# Patient Record
Sex: FEMALE | Race: WHITE | NOT HISPANIC OR LATINO | Employment: FULL TIME | URBAN - METROPOLITAN AREA
[De-identification: names, ages, dates, MRNs, and addresses within clinical notes are randomized per-mention and may not be internally consistent; named-entity substitution may affect disease eponyms.]

---

## 2021-03-05 ENCOUNTER — TRANSFERRED RECORDS (OUTPATIENT)
Dept: HEALTH INFORMATION MANAGEMENT | Facility: CLINIC | Age: 66
End: 2021-03-05

## 2021-03-30 ENCOUNTER — TRANSFERRED RECORDS (OUTPATIENT)
Dept: HEALTH INFORMATION MANAGEMENT | Facility: CLINIC | Age: 66
End: 2021-03-30

## 2021-03-31 ENCOUNTER — TELEPHONE (OUTPATIENT)
Dept: FAMILY MEDICINE | Facility: OTHER | Age: 66
End: 2021-03-31

## 2021-03-31 NOTE — TELEPHONE ENCOUNTER
I called the patient and let her know that ENT is taking new patients. She was transferred to the  to make an appointment.

## 2021-03-31 NOTE — TELEPHONE ENCOUNTER
Pt calling and would like to know if  is taking new patients?    Has had polyps in sinuses removed x4.Last time was 2016.    She was told she should have it again before she starts on any other treatment for her lungs d/t AERD.        Please call at [phone]    Izabela Salmon RN

## 2021-04-19 ENCOUNTER — OFFICE VISIT (OUTPATIENT)
Dept: OTOLARYNGOLOGY | Facility: OTHER | Age: 66
End: 2021-04-19
Attending: OTOLARYNGOLOGY
Payer: COMMERCIAL

## 2021-04-19 VITALS
TEMPERATURE: 97.5 F | OXYGEN SATURATION: 94 % | HEIGHT: 63 IN | DIASTOLIC BLOOD PRESSURE: 72 MMHG | BODY MASS INDEX: 39.69 KG/M2 | WEIGHT: 224 LBS | HEART RATE: 73 BPM | SYSTOLIC BLOOD PRESSURE: 140 MMHG

## 2021-04-19 DIAGNOSIS — H60.8X3 CHRONIC ECZEMATOUS OTITIS EXTERNA OF BOTH EARS: ICD-10-CM

## 2021-04-19 DIAGNOSIS — J30.89 PERENNIAL ALLERGIC RHINITIS: ICD-10-CM

## 2021-04-19 DIAGNOSIS — J33.9 DISORDER OF RESPIRATORY SYSTEM EXACERBATED BY ASPIRIN: ICD-10-CM

## 2021-04-19 DIAGNOSIS — Z88.6 DISORDER OF RESPIRATORY SYSTEM EXACERBATED BY ASPIRIN: ICD-10-CM

## 2021-04-19 DIAGNOSIS — R43.0 ANOSMIA: ICD-10-CM

## 2021-04-19 DIAGNOSIS — J33.9 NASAL POLYP: ICD-10-CM

## 2021-04-19 DIAGNOSIS — J45.909 DISORDER OF RESPIRATORY SYSTEM EXACERBATED BY ASPIRIN: ICD-10-CM

## 2021-04-19 DIAGNOSIS — J32.4 CHRONIC PANSINUSITIS: Primary | ICD-10-CM

## 2021-04-19 PROBLEM — I10 ESSENTIAL HYPERTENSION: Status: ACTIVE | Noted: 2021-04-19

## 2021-04-19 PROBLEM — M20.41 HAMMER TOE OF RIGHT FOOT: Status: ACTIVE | Noted: 2017-10-31

## 2021-04-19 PROCEDURE — 31231 NASAL ENDOSCOPY DX: CPT | Performed by: OTOLARYNGOLOGY

## 2021-04-19 PROCEDURE — 99204 OFFICE O/P NEW MOD 45 MIN: CPT | Mod: 25 | Performed by: OTOLARYNGOLOGY

## 2021-04-19 RX ORDER — EPINEPHRINE 0.3 MG/.3ML
0.3 INJECTION SUBCUTANEOUS
Qty: 2 EACH | Refills: 11 | Status: SHIPPED | OUTPATIENT
Start: 2021-04-19

## 2021-04-19 RX ORDER — VIT C/B6/B5/MAGNESIUM/HERB 173 50-5-6-5MG
500 CAPSULE ORAL 2 TIMES DAILY
COMMUNITY
End: 2023-04-20

## 2021-04-19 RX ORDER — BUDESONIDE 0.5 MG/2ML
INHALANT ORAL
Qty: 2 ML | Refills: 11 | Status: SHIPPED | OUTPATIENT
Start: 2021-04-19 | End: 2021-06-28

## 2021-04-19 RX ORDER — PREDNISONE 10 MG/1
TABLET ORAL
Qty: 21 TABLET | Refills: 1 | Status: SHIPPED | OUTPATIENT
Start: 2021-04-19 | End: 2021-06-15

## 2021-04-19 RX ORDER — HYDROCHLOROTHIAZIDE 12.5 MG/1
12.5 TABLET ORAL DAILY
COMMUNITY
Start: 2021-01-13

## 2021-04-19 RX ORDER — CITALOPRAM HYDROBROMIDE 20 MG/1
20 TABLET ORAL DAILY
COMMUNITY

## 2021-04-19 RX ORDER — FLUTICASONE PROPIONATE AND SALMETEROL XINAFOATE 45; 21 UG/1; UG/1
2 AEROSOL, METERED RESPIRATORY (INHALATION) DAILY
COMMUNITY

## 2021-04-19 RX ORDER — MOMETASONE FUROATE 1 MG/G
OINTMENT TOPICAL DAILY
Qty: 45 G | Refills: 1 | Status: SHIPPED | OUTPATIENT
Start: 2021-04-19 | End: 2023-04-20

## 2021-04-19 RX ORDER — MONTELUKAST SODIUM 10 MG/1
10 TABLET ORAL DAILY
COMMUNITY
Start: 2021-03-30

## 2021-04-19 ASSESSMENT — PAIN SCALES - GENERAL: PAINLEVEL: NO PAIN (0)

## 2021-04-19 ASSESSMENT — MIFFLIN-ST. JEOR: SCORE: 1527.25

## 2021-04-19 NOTE — PROGRESS NOTES
Otolaryngology Consultation    Patient: Elaine Lara  : 1955    Patient presents with:  Consult: Sinus Polyps, Allergies, Chronic Sinusitis; Referred by Rufino Reed      HPI:  Elaine Lara is a 66 year old female seen today for chronic sinusitis and concerns for nasal polyps, referred by Dr. Rufino Reed.    She has a significant history of aspirin exacerbated respiratory syndrome (AERD) and chronic recurrent sinusitis with nasal polyps  She had 4 prior sinus surgeries most recently about 5 years ago with Dr. Lynch  She notes improvement post operatively  .  She had been using budesonide irrigations with some improvement  She recently had new exposure after her carpet was c reviewed former operative note of Dr. Lycnh dated  hanged out and resultant worsening chronic bilateral congestion    Current symptoms include bilateral congestion, chronic post nasal drainage and rhinorrhea  15-20 years of anosmia and taste disturbance    Her asthma has been well controlled    She has an anaphylactic response to ibuprofen     Polyposis was noted on recent exam by Dr. Walsh no recent imaging  She has responded well to prednisone taper in the past    Currently she takes Clarinex D Singulair and Advair, but thought she was allergic to histamine because she responded to histamine on skin testing and stopped her AH.  Congestion has worsened.    On 3/30/2021She was referred to Dr. Mcclellan in [place] for allergy testing.    Skin testing was negative.  She does not have an EpiPen.      Distant hx of perennial allergic rhinitis with treatment with subcutaneous immunotherapy in distant past, treated by Dr. Ayers in the past.    No recent imaging    SNOT 77    She lives in [place]  She has not used her nebulizer in 3 months  Works for [employer]    No tobacco quit 19 ys ago    No known family hx of nasal polyposis or severe allergies    She also notes occasional right globus she feels like something is caught in her upper  "right throat.  No weight loss or otalgia no tobacco use    Prior op note dated 2/26/2016 was reviewed by Dr. Lynch stated as bilateral revision nasal polypectomy bilateral revision endoscopic sphenoethmoidectomy.  Blood loss was 65 mL    Current Outpatient Rx   Medication Sig Dispense Refill     citalopram (CELEXA) 20 MG tablet Take 20 mg by mouth daily       fluticasone-salmeterol (ADVAIR HFA) 45-21 MCG/ACT inhaler Apply 2 puffs into one nostril alternating nostrils daily       hydrochlorothiazide (HYDRODIURIL) 12.5 MG tablet Take 12.5 mg by mouth daily       montelukast (SINGULAIR) 10 MG tablet Take 10 mg by mouth daily       Turmeric (QC TUMERIC COMPLEX) 500 MG CAPS Take 500 mg by mouth 2 times daily         Allergies: Ibuprofen, Sulfamethoxazole w/trimethoprim, Hydrocodone-acetaminophen, and Acetaminophen     No past medical history on file.    No past surgical history on file.    ENT family history reviewed    Social History     Tobacco Use     Smoking status: Never Smoker     Smokeless tobacco: Never Used   Substance Use Topics     Alcohol use: None     Drug use: None       Review of Systems  ROS: 10 point ROS neg other than the symptoms noted above in the HPI and eye floaters shortness of breath supine chronic ear plugging GERD    Physical Exam  BP (!) 140/72 (BP Location: Right arm, Patient Position: Sitting, Cuff Size: Adult Regular)   Pulse 73   Temp 97.5  F (36.4  C) (Tympanic)   Ht 1.588 m (5' 2.5\")   Wt 101.6 kg (224 lb)   SpO2 94%   BMI 40.32 kg/m    General - The patient is well nourished and well developed, and appears to have good nutritional status.  Alert and oriented to person and place, answers questions and cooperates with examination appropriately.   Head and Face - Normocephalic and atraumatic, with no gross asymmetry noted.  The facial nerve is intact, with strong symmetric movements.   Voice and Breathing - The patient was breathing comfortably without the use of accessory muscles. " There was no wheezing, stridor, or stertor.  The patients voice was clear and strong. hyponasal voice qualityNo jin peripheral digital clubbing or cyanosis   Ears -bilateral eczematoid otitis externa without canal cholesteatoma no otorrhea . the external auditory canals are patent, the tympanic membranes are intact without effusion, retraction or mass.  Bony landmarks are intact.  Eyes - Extraocular movements intact, and the pupils were reactive to light.  Sclera were not icteric or injected, conjunctiva were pink and moist.  Mouth - Examination of the oral cavity showed pink, healthy oral mucosa. No lesions or ulcerations noted.  The tongue was mobile and midline, and the dentition were in good condition.    Throat - The walls of the oropharynx were smooth, pink, moist, symmetric, and had no lesions or ulcerations.  The tonsillar pillars and soft palate were symmetric.  The uvula was midline on elevation.    Neck - No palpable enlarged fixed cervical lymph nodes.  No neck cysts or unusual tenderness to palpation.   No palpable fixed thyroid nodules or concerning goiter.  The trachea is grossly midline.   Nose - External contour is asymmetric.  Nasal mucosa is pink and moist with no abnormal mucus.  Bilateral occlusive nasal polyposis .  Diffuse boggy mucosa and inferior turbinate hypertrophy      Attempts at mirror laryngoscopy were not possible due to gag reflex.  Therefore I proceeded with a fiberoptic examination after informed consent.  First I applied topical nasal lidocaine and neosynephrine.  I then passed the scope through the nasal cavity.     The nasopharynx was mucosally covered and symmetric.  The eustachian tube openings were unobstructed on the left, partially occluded right with polyposis.  Going further down I had a clear view of the base of tongue which had normal appearing lingual tonsillar tissue.  The base of tongue was free of lesions, and the vallecula was open.  The epiglottis was smooth  and mucosally covered.  The supraglottic larynx was then clearly visualized.  The vocal cords moved smoothly and symmetrically and were without mass or nodules.  Vocal cord mobility was normal bilaterally with phonation and respiration..  The pyriform sinuses were open and without jin mass or pooling of secretions upon valsalva, and the limited view of the postcricoid region did not show any lesions.    I then changed to a rigid scope and suction debrided thick mucus from the inferior and middle meatus which are completely occluded with large inflammatory nasal polyps.  Elaine tolerated the procedure well.      Impression and Plan- Elaine Lara is a 66 year old female with:    ICD-10-CM    1. Chronic pansinusitis  J32.4 CT Sinus w/o Contrast     EPINEPHrine (ANY BX GENERIC EQUIV) 0.3 MG/0.3ML injection 2-pack     budesonide (PULMICORT) 0.5 MG/2ML neb solution     predniSONE (DELTASONE) 10 MG tablet   2. Nasal polyp  J33.9 CT Sinus w/o Contrast     predniSONE (DELTASONE) 10 MG tablet   3. Chronic eczematous otitis externa of both ears  H60.8X3 mometasone (ELOCON) 0.1 % external ointment   4. Disorder of respiratory system exacerbated by aspirin  J98.4     T39.015A    5. Anosmia  R43.0    6. h/o perennial allergic rhinitis post SCIT  J30.89      AERDS/chronic recurrent sinusitis with nasal polyps    CT sinuses pending  Continue budesonide irrigations  Restart any second gen antihistamine    Dilute vinegar ear wash  Elocon to EAC  Stop q-tips    Continue aspirin and ibu avoidance, desensitization briefly discussed as an option only if no improvement with surgery, topical steroids and dupixent    Risks of oral steroid use were discussed and include psychiatric/mood changes, insomnia, stomach ulcers and potential GI bleeding, blood sugar elevation/worsening diabetes, hip/bone necrosis called avascular necrosis, or bone demineralization.        Addendum:   CT sinus dated 4/23/21 shows pan opacification with polyp density  throughout the anterior and posterior ethmoid maxillary sinuses and extending into the right choana   there is an opacified left Onodi cell    hypoplastic frontal sinuses the small amount of aerated space is filled with polyp appearing opacification.    diffuse bony changes throughout the sinuses with thickening of the sphenoid bone and frontal bone the polyps extend into and occlude the inferior meatus and middle meatus     The skull base is intact  keros 2  The lamina is grossly intact but exceedingly thin bilaterally  the optic nerve is not dehiscent     [place] 22/24    The risks and complications of bilateral endoscopic sinus surgery with polypectomy, turbinate reduction were openly discussed.  The potential risks include bleeding, general anesthesia, infection, scar formation, need for additional surgery, septal perforation, and rarely injury to the eye with the possibility of blindness,  injury to the brain, meningitis or other intracranial complications.  Nonsurgical options were discussed including prolonged antibioitics and/or oral and topical steroids.   Sinus anatomy and sinus disease were reviewed.  CT will be reviewed prior to surgery prior operative note from Dr. Lynch dated 2/26/2016 was reviewed.  All questions were answered.     For post op pain she will use tylenol and prednisone, no ibuprofen, cannot take hydrocodone    Polyps will recur but hopefully never to this severity again, discussed with Elaine  No guarantees were given that her anosmia would resolve with surgery    Continue all current medications recommended by me or my staff.    Continue budesonide irrigations.  Verbal and written education on use provided.    The importance of budesonide irrigations postoperatively was reinforced.    The correct use of nasal irrigations as prescribed will prevent synechiae and allow for proper recovery of the sinus mucosa.       Anosmia precautions reinforced    Start Dupixent (dupilumab) once prior  authorization completed and after recovered from surgery  Treatment was discussed with today.  We discussed that this is a biologic and specifically an IL-4 receptor antagonist meaning it works entirely differently from other current medications.  Adverse reactions of therapy were discussed, including most commonly injection site reactions  Injection technique will be taught and self injections will be performed every 2 weeks.  Length of therapy was also discussed which is life long  Verbal and written education provided            Afua Cowan D.O.  Otolaryngology/Head and Neck Surgery  Allergy

## 2021-04-19 NOTE — PATIENT INSTRUCTIONS
Thank you for allowing Dr. Cowan and our ENT team to participate in your care.  If your medications are too expensive, please give the nurse a call.  We can possibly change this medication.  If you have a scheduling or an appointment question please contact our Health Unit Coordinator at their direct line [phone].   ALL nursing questions or concerns can be directed to your ENT nurse at: [phone] - [place]    Complete Sinus CT Scan  Use Budesonide Rinses Twice Daily  Start Prednisone 7 days before surgery   your epi pen  Complete Covid Testing 4 days before surgery  Follow up in surgery    Budesonide nasal saline irrigation per instructions:  -Obtain Aamir Med Sinus rinse over the counter.    -Use warm distilled water and 2 packets of the salt solution that comes with the bottle, dissolve in bottle up to the 240 mL desiree.  -Add 1 vial of budesonide.  -Irrigate each side of your nose leaning over the sink, using 1/3 to 1/2 the volume of the bottle in each nostril every irrigation.  Irrigate 2 times daily.  -If additional rinses are needed/recommended, you may use the plan Aamir Med Sinus irrigation without the use of added budesonide.         Instructions for Sinus Surgery    Recovery - Everyone recovers differently from a general anesthetic.  Symptoms such as fatigue, nausea, light-headedness, and sometimes a low grade fever (up to 100 degrees) are not unusual.  As your body removes the anesthetic drugs from circulation, these symptoms will resolve.  Your nose will be sore after surgery, and you may even have symptoms similar to a sinus infection with headache, congestion, and pressure.  These will resolve with healing.  For several days you may experience bloody drainage from the nose, please use the drip pad as necessary for this.  If there is persistent bleeding, please call the office during business hours or the on call ENT physician after hours.  There are no diet restrictions after  sinus surgery, and you can resume your home medications.      Please do not blow your nose until 1 week after surgery.  At 1 week you may gently blow your nose, unless otherwise indicated by Dr. Cowan.     Limit your activity to no strenuous activities until I see you for the first follow-up visit in approximately 2 weeks.      Medications - You were sent home with narcotic pain medication.  If you can tolerate the discomfort during your recovery by using just plain Tylenol or ibuprofen (advil), please do so.  However, do not hesitate to use the stronger pain medication if needed.  If you were sent home with an antibiotic, it is primarily used to help the healing process.  If it causes loose bowel movements or other signs of intolerance, it is appropriate to discontinue it.  By far the most important measure you can take to speed recovery, and maximize the chances of long term success of sinus surgery is using the sinus rinses at least three time per day for the first month after surgery.       Start Uzma Med saline irrigation tomorrow and use at least 5 times daily.     I recommend 2 uzma med bottles, one to add the budesonide to and irrigate with the budesonide rinse twice a day for 2 months.    In the other uzma med bottle use only the saline solution, and irrigate with this at least 3 additional times daily.    Perform gentle irrigation for the first week.  Starting 1 week after surgery, you should increase the volume of uzma med saline irrigation to each nostril, continuing to use the rinses in an alternating fashion at least 5 times daily.  You cannot use too much of the uzma med saline, but please limit budesonide rinses to twice daily.    At 2 weeks after surgery, you may also restart nasal steroids (flonase, nasonex, etc).        Complications - Problems related to sinus surgery almost always are detected during the operation, and special instruction will be given in that situation.  However,  unexpected things can happen, and are all related to the structures around the sinus cavities.  Symptoms that should alert you to a possible problem include: severe eye pain or eye swelling, persistent heavy bleeding from the nose, and high fevers with headache and neck pain.  Any of these symptoms should be called into my office or to the on call ENT if after hours.  The most common non-emergency complication of sinus surgery is the formation of scar tissue which can re-block the sinuses.  This is addressed below.    Follow-up -  As you have noted, there are quite a few follow-up visits after sinus surgery.  This is done to aggressively manage the most common complication of this technique, which is scar tissue blocking the sinuses.  These visits will require the examination of your nose and possibly removal of crusts of dry mucous and blood, with possible removal of early scar tissue.  Please prepare for these visits by using your sinus rinses.    If there are any questions or issues with the above, or if there are other issues that concern you, always feel free to call the clinic and I am happy to speak with you as soon as I can.    Afua Cowan D.O.  Otolaryngology/Head and Neck Surgery  Allergy    [phone] office            HOW TO PREPARE-      You need to have a scheduled Pre-Op with your primary care physician within 30 days of your scheduled surgery.        You need a friend or family member available to drive you home AND stay with you for 24 hours after you leave the hospital. You will not be allowed to drive yourself. IF you need to take a taxi or the bus you MUST have a responsible person to ride with you. YOUR PROCEDURE WILL BE CANCELLED IF YOU DO NOT HAVE A RESPONSIBLE ADULT TO DRIVE YOU HOME.       You CANNOT have anything to eat or drink after midnight the night before your surgery, including water and coffee. Your stomach needs to be completely empty. Do NOT chew gum, suck on hard  candy, or smoke. You can brush your teeth the morning of surgery.       The Surgery Education Nurses will call you  1-2 weeks prior to your surgery date at  [phone] or toll free [phone]. Please have your medication and allergy lists ready.      Stop your aspirin or other NSAIDs(Ibuprofen, Motrin, Aleve, Celebrex, Naproxen, etc...) 7 days before your surgery.      Hospital admitting will call you the day before your surgery with your exact arrival time.       Please call your primary care physician if you should become ill within 24 hours of scheduled surgery. (ex.vomiting, diarrhea, fever)          You will need to wash the night before AND the morning of you procedure with a liquid antibacterial soap, like Dial.

## 2021-04-19 NOTE — LETTER
2021         RE: Elaine Lara  [address] [facility] [address]        Dear Colleague,    Thank you for referring your patient, Elaine Lara, to the [facility]. Please see a copy of my visit note below.    Otolaryngology Consultation    Patient: Elaine Lara  : 1955    Patient presents with:  Consult: Sinus Polyps, Allergies, Chronic Sinusitis; Referred by Rufino Reed      HPI:  Elaine Lara is a 66 year old female seen today for chronic sinusitis and concerns for nasal polyps, referred by Dr. Rufino Reed.    She has a significant history of aspirin exacerbated respiratory syndrome (AERD) and chronic recurrent sinusitis with nasal polyps  She had 4 prior sinus surgeries most recently about 5 years ago with Dr. Lynch  She notes improvement post operatively  .  She had been using budesonide irrigations with some improvement  She recently had new exposure after her carpet was c reviewed former operative note of Dr. Lynch dated  hanged out and resultant worsening chronic bilateral congestion    Current symptoms include bilateral congestion, chronic post nasal drainage and rhinorrhea  15-20 years of anosmia and taste disturbance    Her asthma has been well controlled    She has an anaphylactic response to ibuprofen     Polyposis was noted on recent exam by Dr. Walsh no recent imaging  She has responded well to prednisone taper in the past    Currently she takes Clarinex D Singulair and Advair, but thought she was allergic to histamine because she responded to histamine on skin testing and stopped her AH.  Congestion has worsened.    On 3/30/2021She was referred to Dr. Mcclellan in [place] for allergy testing.    Skin testing was negative.  She does not have an EpiPen.      Distant hx of perennial allergic rhinitis with treatment with subcutaneous immunotherapy in distant past, treated by Dr. Ayers in the past.    No recent imaging    SNOT 77    She lives in  [place]  She has not used her nebulizer in 3 months  Works for [employer]    No tobacco quit 19 ys ago    No known family hx of nasal polyposis or severe allergies    She also notes occasional right globus she feels like something is caught in her upper right throat.  No weight loss or otalgia no tobacco use    Prior op note dated 2/26/2016 was reviewed by Dr. Lynch stated as bilateral revision nasal polypectomy bilateral revision endoscopic sphenoethmoidectomy.  Blood loss was 65 mL    Current Outpatient Rx   Medication Sig Dispense Refill     citalopram (CELEXA) 20 MG tablet Take 20 mg by mouth daily       fluticasone-salmeterol (ADVAIR HFA) 45-21 MCG/ACT inhaler Apply 2 puffs into one nostril alternating nostrils daily       hydrochlorothiazide (HYDRODIURIL) 12.5 MG tablet Take 12.5 mg by mouth daily       montelukast (SINGULAIR) 10 MG tablet Take 10 mg by mouth daily       Turmeric (QC TUMERIC COMPLEX) 500 MG CAPS Take 500 mg by mouth 2 times daily         Allergies: Ibuprofen, Sulfamethoxazole w/trimethoprim, Hydrocodone-acetaminophen, and Acetaminophen     No past medical history on file.    No past surgical history on file.    ENT family history reviewed    Social History     Tobacco Use     Smoking status: Never Smoker     Smokeless tobacco: Never Used   Substance Use Topics     Alcohol use: None     Drug use: None       Review of Systems  ROS: 10 point ROS neg other than the symptoms noted above in the HPI and eye floaters shortness of breath supine chronic ear plugging GERD    Physical Exam  BP (!) 140/72 (BP Location: Right arm, Patient Position: Sitting, Cuff Size: Adult Regular)   Pulse 73   Temp 97.5  F (36.4  C) (Tympanic)   Ht 1.588 m (5' 2.5\")   Wt 101.6 kg (224 lb)   SpO2 94%   BMI 40.32 kg/m    General - The patient is well nourished and well developed, and appears to have good nutritional status.  Alert and oriented to person and place, answers questions and cooperates with examination " appropriately.   Head and Face - Normocephalic and atraumatic, with no gross asymmetry noted.  The facial nerve is intact, with strong symmetric movements.   Voice and Breathing - The patient was breathing comfortably without the use of accessory muscles. There was no wheezing, stridor, or stertor.  The patients voice was clear and strong. hyponasal voice qualityNo jin peripheral digital clubbing or cyanosis   Ears -bilateral eczematoid otitis externa without canal cholesteatoma no otorrhea . the external auditory canals are patent, the tympanic membranes are intact without effusion, retraction or mass.  Bony landmarks are intact.  Eyes - Extraocular movements intact, and the pupils were reactive to light.  Sclera were not icteric or injected, conjunctiva were pink and moist.  Mouth - Examination of the oral cavity showed pink, healthy oral mucosa. No lesions or ulcerations noted.  The tongue was mobile and midline, and the dentition were in good condition.    Throat - The walls of the oropharynx were smooth, pink, moist, symmetric, and had no lesions or ulcerations.  The tonsillar pillars and soft palate were symmetric.  The uvula was midline on elevation.    Neck - No palpable enlarged fixed cervical lymph nodes.  No neck cysts or unusual tenderness to palpation.   No palpable fixed thyroid nodules or concerning goiter.  The trachea is grossly midline.   Nose - External contour is asymmetric.  Nasal mucosa is pink and moist with no abnormal mucus.  Bilateral occlusive nasal polyposis .  Diffuse boggy mucosa and inferior turbinate hypertrophy      Attempts at mirror laryngoscopy were not possible due to gag reflex.  Therefore I proceeded with a fiberoptic examination after informed consent.  First I applied topical nasal lidocaine and neosynephrine.  I then passed the scope through the nasal cavity.     The nasopharynx was mucosally covered and symmetric.  The eustachian tube openings were unobstructed on the  left, partially occluded right with polyposis.  Going further down I had a clear view of the base of tongue which had normal appearing lingual tonsillar tissue.  The base of tongue was free of lesions, and the vallecula was open.  The epiglottis was smooth and mucosally covered.  The supraglottic larynx was then clearly visualized.  The vocal cords moved smoothly and symmetrically and were without mass or nodules.  Vocal cord mobility was normal bilaterally with phonation and respiration..  The pyriform sinuses were open and without jin mass or pooling of secretions upon valsalva, and the limited view of the postcricoid region did not show any lesions.    I then changed to a rigid scope and suction debrided thick mucus from the inferior and middle meatus which are completely occluded with large inflammatory nasal polyps.  Elaine tolerated the procedure well.      Impression and Plan- Elaine Lara is a 66 year old female with:    ICD-10-CM    1. Chronic pansinusitis  J32.4 CT Sinus w/o Contrast     EPINEPHrine (ANY BX GENERIC EQUIV) 0.3 MG/0.3ML injection 2-pack     budesonide (PULMICORT) 0.5 MG/2ML neb solution     predniSONE (DELTASONE) 10 MG tablet   2. Nasal polyp  J33.9 CT Sinus w/o Contrast     predniSONE (DELTASONE) 10 MG tablet   3. Chronic eczematous otitis externa of both ears  H60.8X3 mometasone (ELOCON) 0.1 % external ointment   4. Disorder of respiratory system exacerbated by aspirin  J98.4     T39.015A    5. Anosmia  R43.0    6. h/o perennial allergic rhinitis post SCIT  J30.89      AERDS/chronic recurrent sinusitis with nasal polyps    CT sinuses pending  Continue budesonide irrigations  Restart any second gen antihistamine    Dilute vinegar ear wash  Elocon to EAC  Stop q-tips    Continue aspirin and ibu avoidance, desensitization briefly discussed as an option only if no improvement with surgery, topical steroids and dupixent    Risks of oral steroid use were discussed and include psychiatric/mood  changes, insomnia, stomach ulcers and potential GI bleeding, blood sugar elevation/worsening diabetes, hip/bone necrosis called avascular necrosis, or bone demineralization.      The risks and complications of bilateral endoscopic sinus surgery with polypectomy, turbinate reduction were openly discussed.  The potential risks include bleeding, general anesthesia, infection, scar formation, need for additional surgery, septal perforation, and rarely injury to the eye with the possibility of blindness,  injury to the brain, meningitis or other intracranial complications.  Nonsurgical options were discussed including prolonged antibioitics and/or oral and topical steroids.   Sinus anatomy and sinus disease were reviewed.  CT will be reviewed prior to surgery prior operative note from Dr. Lynch dated 2/26/2016 was reviewed.  All questions were answered.     Polyps will recur but hopefully never to this severity again, discussed with Elaine  No guarantees were given that her anosmia would resolve with surgery    Continue all current medications recommended by me or my staff.    Continue budesonide irrigations.  Verbal and written education on use provided.    The importance of budesonide irrigations postoperatively was reinforced.    The correct use of nasal irrigations as prescribed will prevent synechiae and allow for proper recovery of the sinus mucosa.       Anosmia precautions reinforced    Start Dupixent (dupilumab) once prior authorization completed and after recovered from surgery  Treatment was discussed with today.  We discussed that this is a biologic and specifically an IL-4 receptor antagonist meaning it works entirely differently from other current medications.  Adverse reactions of therapy were discussed, including most commonly injection site reactions  Injection technique will be taught and self injections will be performed every 2 weeks.  Length of therapy was also discussed which is life long  Verbal  and written education provided            Afua Cowan D.O.  Otolaryngology/Head and Neck Surgery  Allergy        Again, thank you for allowing me to participate in the care of your patient.        Sincerely,        Afua Cowan MD

## 2021-04-19 NOTE — NURSING NOTE
"Chief Complaint   Patient presents with     Consult     Sinus Polyps, Allergies, Chronic Sinusitis; Referred by Rufino Reed       Initial BP (!) 140/72 (BP Location: Right arm, Patient Position: Sitting, Cuff Size: Adult Regular)   Pulse 73   Temp 97.5  F (36.4  C) (Tympanic)   Ht 1.588 m (5' 2.5\")   Wt 101.6 kg (224 lb)   SpO2 94%   BMI 40.32 kg/m   Estimated body mass index is 40.32 kg/m  as calculated from the following:    Height as of this encounter: 1.588 m (5' 2.5\").    Weight as of this encounter: 101.6 kg (224 lb).  Medication Reconciliation: complete  Mercedes Jacinto LPN    "

## 2021-04-20 ENCOUNTER — TELEPHONE (OUTPATIENT)
Dept: ALLERGY | Facility: OTHER | Age: 66
End: 2021-04-20

## 2021-04-20 DIAGNOSIS — R43.0 ANOSMIA: ICD-10-CM

## 2021-04-20 DIAGNOSIS — J33.9 NASAL POLYP: ICD-10-CM

## 2021-04-20 DIAGNOSIS — J32.9 CHRONIC SINUSITIS, UNSPECIFIED LOCATION: Primary | ICD-10-CM

## 2021-04-20 NOTE — TELEPHONE ENCOUNTER
Dr. Cowan would like patient to start Dupixent.  Medication is pended for signature, with [facility].  Thank you!    Lilibeth Loera RN

## 2021-04-20 NOTE — TELEPHONE ENCOUNTER
Please prior-auth for dupixent   She is very interested   She will be going to surgery within next few months     Can start Dupixent asap--can be before surgery if approved     Thanks   JASBIR

## 2021-04-23 ENCOUNTER — HOSPITAL ENCOUNTER (OUTPATIENT)
Dept: CT IMAGING | Facility: HOSPITAL | Age: 66
Discharge: HOME | End: 2021-04-23
Attending: OTOLARYNGOLOGY | Admitting: OTOLARYNGOLOGY
Payer: COMMERCIAL

## 2021-04-23 DIAGNOSIS — J32.4 CHRONIC PANSINUSITIS: ICD-10-CM

## 2021-04-23 DIAGNOSIS — J33.9 NASAL POLYP: ICD-10-CM

## 2021-04-23 PROCEDURE — 70486 CT MAXILLOFACIAL W/O DYE: CPT

## 2021-04-26 ENCOUNTER — PREP FOR PROCEDURE (OUTPATIENT)
Dept: OTOLARYNGOLOGY | Facility: OTHER | Age: 66
End: 2021-04-26

## 2021-04-26 ENCOUNTER — TELEPHONE (OUTPATIENT)
Dept: OTOLARYNGOLOGY | Facility: OTHER | Age: 66
End: 2021-04-26

## 2021-04-26 ENCOUNTER — TELEPHONE (OUTPATIENT)
Dept: ALLERGY | Facility: OTHER | Age: 66
End: 2021-04-26

## 2021-04-26 DIAGNOSIS — J32.4 CHRONIC PANSINUSITIS: Primary | ICD-10-CM

## 2021-04-26 DIAGNOSIS — R43.0 ANOSMIA: ICD-10-CM

## 2021-04-26 DIAGNOSIS — J33.9 NASAL POLYP: ICD-10-CM

## 2021-04-26 NOTE — TELEPHONE ENCOUNTER
PA Initiation    Medication: dupixent  Insurance Company: [employer] ([facility]) - Phone [phone] Fax [phone]  Pharmacy Filling the Rx:    Filling Pharmacy Phone:    Filling Pharmacy Fax:    Start Date: 4/26/2021

## 2021-04-26 NOTE — TELEPHONE ENCOUNTER
Spoke with patient regarding Dupixent medication that Dr. Cowan would like the patient to start.  Explained the approval process and the fact the medication copay is often costly.  Explained there are numerous resources available to assist with cost.  Patient is aware this is a process and may take additional time if financial resources are needed.  Will mail Dupixent My Way paperwork to the patient so she can complete the enrollment paperwork and return it to this writer in case those resources are needed.  Patient verbalized understanding.    Also explained to the patient that the prescription was sent to [facility] for processing.  They will reach out to her if/when the medication is approved.    Lilibeth Loera RN

## 2021-04-30 NOTE — TELEPHONE ENCOUNTER
I received a phone call from Dipika at [employer] with questions regarding on the PA and answers provided.I was not able to help her. I am not able to reach Cecilia. Will route to her and ask that she return Dipika's call at [phone], Option 2. Case [id number].

## 2021-05-03 NOTE — TELEPHONE ENCOUNTER
I called [employer] #[phone], CONNOR Hardin advised they denied PA showing PT needed to show trials of 2 preferred  Therapy. I did advised rep Pt did try Mometasone, Montetlukast as well as fluticasone. Faxed Recent chart notes to Appeal line #[phone] to show recent notes to overide denial

## 2021-05-07 NOTE — TELEPHONE ENCOUNTER
Check Appeal statues #[phone], case [id number] ref #[id number], appeal under review as of 5/3, determination by 5/18

## 2021-05-18 NOTE — TELEPHONE ENCOUNTER
MEDICATION APPEAL APPROVED    Medication: dupixent  Authorization Effective Date:  5/14/21  Authorization Expiration Date:11/14/2021    Approved Dose/Quantity: 4/28  Reference #: [id number]   Insurance Company:    Expected CoPay:       CoPay Card Available:    350.00  Foundation Assistance Needed:    Which Pharmacy is filling the prescription (Not needed for infusion/clinic administered):

## 2021-05-18 NOTE — TELEPHONE ENCOUNTER
Spoke with patient.  She is receiving her Dupixent shipment on Friday, May 21st.  Patient is scheduled for the first available appointment to come in for education and self-injection instruction on May 27th.  Patient is aware she needs to bring the medication with her and that she will be here for approximately 1 hour.    Lilibeth Loera RN

## 2021-05-25 ENCOUNTER — ANESTHESIA EVENT (OUTPATIENT)
Dept: SURGERY | Facility: HOSPITAL | Age: 66
End: 2021-05-25
Payer: COMMERCIAL

## 2021-05-25 NOTE — ANESTHESIA PREPROCEDURE EVALUATION
Anesthesia Pre-Procedure Evaluation    Patient: Elaine Lara   MRN: [id number] : 1955        Preoperative Diagnosis: Chronic pansinusitis [J32.4]  Nasal polyp [J33.9]  Anosmia [R43.0]   Procedure : Procedure(s):  bilateral endoscopic sinus surgery with polypectomy  Turbinate reduction     No past medical history on file.   No past surgical history on file.   Allergies   Allergen Reactions     Ibuprofen Anaphylaxis     Other reaction(s): throat swells, hives  Head swelled up and started to swell in her throat before she was taken to the ER       Sulfamethoxazole W/Trimethoprim Rash     Hydrocodone-Acetaminophen Hives     Acetaminophen Rash     lortab      Social History     Tobacco Use     Smoking status: Never Smoker     Smokeless tobacco: Never Used   Substance Use Topics     Alcohol use: Not on file      Wt Readings from Last 1 Encounters:   21 101.6 kg (224 lb)        Anesthesia Evaluation   Pt has had prior anesthetic.     History of anesthetic complications  - PONV.  slow to wake.    ROS/MED HX  ENT/Pulmonary: Comment: Chronic sinusitis  Nasal polyp  Chronic eczematous otitis externa of both ears  Chronic pansinusitis    (+) allergic rhinitis, tobacco use, Past use, Moderate Persistent, asthma Treatment: Inhaler prn,      Neurologic:  - neg neurologic ROS     Cardiovascular:     (+) hypertension-----MAY. Previous cardiac testing   Echo: Date: Results:    Stress Test: Date: Results:    ECG Reviewed: Date: 2021 Results:  Possible anterior septal infarct   NSR  Cath: Date: Results:      METS/Exercise Tolerance: >4 METS    Hematologic:  - neg hematologic  ROS     Musculoskeletal:   (+) arthritis,     GI/Hepatic:     (+) GERD, Asymptomatic on medication,     Renal/Genitourinary: Comment: Mixed urge incontinence - neg Renal ROS     Endo: Comment: Impaired fasting glucose    (+) Obesity,     Psychiatric/Substance Use:  - neg psychiatric ROS     Infectious Disease:  - neg infectious disease ROS      Malignancy:  - neg malignancy ROS     Other:  - neg other ROS          Physical Exam    Airway        Mallampati: II   TM distance: > 3 FB   Neck ROM: full   Mouth opening: > 3 cm    Respiratory Devices and Support         Dental  no notable dental history         Cardiovascular   cardiovascular exam normal       Rhythm and rate: regular and normal     Pulmonary   pulmonary exam normal    Comment: Patient taking inhaler    (+) wheezes           OUTSIDE LABS:  CBC: No results found for: WBC, HGB, HCT, PLT  BMP: No results found for: NA, POTASSIUM, CHLORIDE, CO2, BUN, CR, GLC  COAGS: No results found for: PTT, INR, FIBR  POC: No results found for: BGM, HCG, HCGS  HEPATIC: No results found for: ALBUMIN, PROTTOTAL, ALT, AST, GGT, ALKPHOS, BILITOTAL, BILIDIRECT, YESI  OTHER: No results found for: PH, LACT, A1C, CORRINE, PHOS, MAG, LIPASE, AMYLASE, TSH, T4, T3, CRP, SED    Anesthesia Plan    ASA Status:  3   NPO Status:  NPO Appropriate    Anesthesia Type: General.              Consents    Anesthesia Plan(s) and associated risks, benefits, and realistic alternatives discussed. Questions answered and patient/representative(s) expressed understanding.     - Discussed with:  Patient      - Extended Intubation/Ventilatory Support Discussed: Yes.    Use of blood products discussed: Yes.     - Discussed with: Patient.     - Consented: consented to blood products            Reason for refusal: other.     Postoperative Care            Comments:    [facility]   Discussed risks and benefits with patient for general anesthesia including sore throat, nausea, vomiting, aspiration, dental damage, loss of airway, CV complications, stroke, MI, death. Pt wishes to proceed.             FRANCISCO Bowling CRNA

## 2021-05-26 ENCOUNTER — TRANSFERRED RECORDS (OUTPATIENT)
Dept: HEALTH INFORMATION MANAGEMENT | Facility: HOSPITAL | Age: 66
End: 2021-05-26

## 2021-05-26 LAB
CREAT SERPL-MCNC: 0.7 MG/DL (ref 0.52–1.04)
GFR SERPL CREATININE-BSD FRML MDRD: 84 ML/MIN/1.73M2
GLUCOSE SERPL-MCNC: 147 MG/DL (ref 65–105)
POTASSIUM SERPL-SCNC: 4.1 MMOL/L (ref 3.6–5.2)

## 2021-05-27 ENCOUNTER — PREP FOR PROCEDURE (OUTPATIENT)
Dept: OTOLARYNGOLOGY | Facility: OTHER | Age: 66
End: 2021-05-27

## 2021-05-27 ENCOUNTER — OFFICE VISIT (OUTPATIENT)
Dept: ALLERGY | Facility: OTHER | Age: 66
End: 2021-05-27
Attending: NURSE PRACTITIONER
Payer: COMMERCIAL

## 2021-05-27 DIAGNOSIS — J33.9 NASAL POLYP: Primary | ICD-10-CM

## 2021-05-27 DIAGNOSIS — J32.9 CHRONIC SINUSITIS, UNSPECIFIED LOCATION: ICD-10-CM

## 2021-05-27 DIAGNOSIS — R43.0 ANOSMIA: ICD-10-CM

## 2021-05-27 NOTE — PROGRESS NOTES
Prior to education, verified patient's identity using patient's name and date of birth.    Patient here for education on Dupixent medication.  All information is reviewed with patient regarding Dupixent (medication information, side effects, dosing/timing, administration sites, medication storage, etc).  Patient is taught how to self-administer the injection using a Dupixent auto-injector .  Return demonstration is done by patient.  Patient then self-administered the medication without issue.  Medication was shipped to the patient's home address from the [facility], it was not provided by the inpatient pharmacy.  Patient held for 30 minutes post-injection to monitor for any possible reactions, no reaction noted.  Patient advised to call with any questions or concerning side effects.  Anaphylaxis was reviewed with patient, and patient is aware of what symptoms would warrant seeking emergency medical care.  Patient verbalized understanding.    Lilibeth Loera RN

## 2021-05-28 ENCOUNTER — OFFICE VISIT (OUTPATIENT)
Dept: FAMILY MEDICINE | Facility: OTHER | Age: 66
End: 2021-05-28
Attending: OTOLARYNGOLOGY
Payer: COMMERCIAL

## 2021-05-28 DIAGNOSIS — Z20.822 COVID-19 RULED OUT: Primary | ICD-10-CM

## 2021-05-28 LAB
SARS-COV-2 RNA RESP QL NAA+PROBE: NORMAL
SPECIMEN SOURCE: NORMAL

## 2021-05-28 PROCEDURE — U0003 INFECTIOUS AGENT DETECTION BY NUCLEIC ACID (DNA OR RNA); SEVERE ACUTE RESPIRATORY SYNDROME CORONAVIRUS 2 (SARS-COV-2) (CORONAVIRUS DISEASE [COVID-19]), AMPLIFIED PROBE TECHNIQUE, MAKING USE OF HIGH THROUGHPUT TECHNOLOGIES AS DESCRIBED BY CMS-2020-01-R: HCPCS | Performed by: OTOLARYNGOLOGY

## 2021-05-28 PROCEDURE — U0005 INFEC AGEN DETEC AMPLI PROBE: HCPCS | Performed by: OTOLARYNGOLOGY

## 2021-05-29 LAB
LABORATORY COMMENT REPORT: NORMAL
SARS-COV-2 RNA RESP QL NAA+PROBE: NEGATIVE
SPECIMEN SOURCE: NORMAL

## 2021-06-01 ENCOUNTER — HOSPITAL ENCOUNTER (OUTPATIENT)
Facility: HOSPITAL | Age: 66
Discharge: HOME | End: 2021-06-01
Attending: OTOLARYNGOLOGY | Admitting: OTOLARYNGOLOGY
Payer: COMMERCIAL

## 2021-06-01 ENCOUNTER — ANESTHESIA (OUTPATIENT)
Dept: SURGERY | Facility: HOSPITAL | Age: 66
End: 2021-06-01
Payer: COMMERCIAL

## 2021-06-01 VITALS
WEIGHT: 217 LBS | HEIGHT: 62 IN | TEMPERATURE: 98.7 F | OXYGEN SATURATION: 92 % | HEART RATE: 70 BPM | BODY MASS INDEX: 39.93 KG/M2 | SYSTOLIC BLOOD PRESSURE: 124 MMHG | RESPIRATION RATE: 16 BRPM | DIASTOLIC BLOOD PRESSURE: 68 MMHG

## 2021-06-01 DIAGNOSIS — R43.0 ANOSMIA: ICD-10-CM

## 2021-06-01 DIAGNOSIS — J33.9 NASAL POLYP: Primary | ICD-10-CM

## 2021-06-01 DIAGNOSIS — J32.4 CHRONIC PANSINUSITIS: ICD-10-CM

## 2021-06-01 DIAGNOSIS — Z98.890 S/P FESS (FUNCTIONAL ENDOSCOPIC SINUS SURGERY): ICD-10-CM

## 2021-06-01 PROCEDURE — 258N000003 HC RX IP 258 OP 636: Performed by: NURSE ANESTHETIST, CERTIFIED REGISTERED

## 2021-06-01 PROCEDURE — 31257 NSL/SINS NDSC TOT W/SPHENDT: CPT | Performed by: NURSE ANESTHETIST, CERTIFIED REGISTERED

## 2021-06-01 PROCEDURE — 87075 CULTR BACTERIA EXCEPT BLOOD: CPT | Performed by: OTOLARYNGOLOGY

## 2021-06-01 PROCEDURE — 250N000011 HC RX IP 250 OP 636: Performed by: NURSE ANESTHETIST, CERTIFIED REGISTERED

## 2021-06-01 PROCEDURE — 710N000012 HC RECOVERY PHASE 2, PER MINUTE: Performed by: OTOLARYNGOLOGY

## 2021-06-01 PROCEDURE — C1726 CATH, BAL DIL, NON-VASCULAR: HCPCS | Performed by: OTOLARYNGOLOGY

## 2021-06-01 PROCEDURE — 31276 NSL/SINS NDSC FRNT TISS RMVL: CPT | Mod: LT | Performed by: OTOLARYNGOLOGY

## 2021-06-01 PROCEDURE — 250N000011 HC RX IP 250 OP 636: Performed by: OTOLARYNGOLOGY

## 2021-06-01 PROCEDURE — C9046 COCAINE HCL NASAL SOLUTION: HCPCS | Performed by: OTOLARYNGOLOGY

## 2021-06-01 PROCEDURE — 250N000009 HC RX 250: Performed by: NURSE ANESTHETIST, CERTIFIED REGISTERED

## 2021-06-01 PROCEDURE — 88311 DECALCIFY TISSUE: CPT | Mod: TC | Performed by: OTOLARYNGOLOGY

## 2021-06-01 PROCEDURE — 31257 NSL/SINS NDSC TOT W/SPHENDT: CPT | Mod: 22 | Performed by: OTOLARYNGOLOGY

## 2021-06-01 PROCEDURE — 999N000141 HC STATISTIC PRE-PROCEDURE NURSING ASSESSMENT: Performed by: OTOLARYNGOLOGY

## 2021-06-01 PROCEDURE — 88312 SPECIAL STAINS GROUP 1: CPT | Mod: TC | Performed by: OTOLARYNGOLOGY

## 2021-06-01 PROCEDURE — 87102 FUNGUS ISOLATION CULTURE: CPT | Performed by: OTOLARYNGOLOGY

## 2021-06-01 PROCEDURE — 99135 ANES COMP CTRLD HYPOTENSION: CPT | Performed by: NURSE ANESTHETIST, CERTIFIED REGISTERED

## 2021-06-01 PROCEDURE — 87077 CULTURE AEROBIC IDENTIFY: CPT | Performed by: OTOLARYNGOLOGY

## 2021-06-01 PROCEDURE — 360N000076 HC SURGERY LEVEL 3, PER MIN: Performed by: OTOLARYNGOLOGY

## 2021-06-01 PROCEDURE — 30930 THER FX NASAL INF TURBINATE: CPT | Performed by: OTOLARYNGOLOGY

## 2021-06-01 PROCEDURE — 31267 ENDOSCOPY MAXILLARY SINUS: CPT | Mod: 22 | Performed by: OTOLARYNGOLOGY

## 2021-06-01 PROCEDURE — 250N000025 HC SEVOFLURANE, PER MIN: Performed by: OTOLARYNGOLOGY

## 2021-06-01 PROCEDURE — 250N000013 HC RX MED GY IP 250 OP 250 PS 637: Performed by: NURSE ANESTHETIST, CERTIFIED REGISTERED

## 2021-06-01 PROCEDURE — 87070 CULTURE OTHR SPECIMN AEROBIC: CPT | Performed by: OTOLARYNGOLOGY

## 2021-06-01 PROCEDURE — 250N000009 HC RX 250: Performed by: OTOLARYNGOLOGY

## 2021-06-01 PROCEDURE — 61782 SCAN PROC CRANIAL EXTRA: CPT | Performed by: OTOLARYNGOLOGY

## 2021-06-01 PROCEDURE — 87186 SC STD MICRODIL/AGAR DIL: CPT | Performed by: OTOLARYNGOLOGY

## 2021-06-01 PROCEDURE — 710N000010 HC RECOVERY PHASE 1, LEVEL 2, PER MIN: Performed by: OTOLARYNGOLOGY

## 2021-06-01 PROCEDURE — 370N000017 HC ANESTHESIA TECHNICAL FEE, PER MIN: Performed by: OTOLARYNGOLOGY

## 2021-06-01 PROCEDURE — 87076 CULTURE ANAEROBE IDENT EACH: CPT | Mod: 59 | Performed by: OTOLARYNGOLOGY

## 2021-06-01 PROCEDURE — 88304 TISSUE EXAM BY PATHOLOGIST: CPT | Mod: TC | Performed by: OTOLARYNGOLOGY

## 2021-06-01 PROCEDURE — 272N000001 HC OR GENERAL SUPPLY STERILE: Performed by: OTOLARYNGOLOGY

## 2021-06-01 PROCEDURE — 87205 SMEAR GRAM STAIN: CPT | Performed by: OTOLARYNGOLOGY

## 2021-06-01 RX ORDER — NALOXONE HYDROCHLORIDE 0.4 MG/ML
0.2 INJECTION, SOLUTION INTRAMUSCULAR; INTRAVENOUS; SUBCUTANEOUS
Status: DISCONTINUED | OUTPATIENT
Start: 2021-06-01 | End: 2021-06-01 | Stop reason: HOSPADM

## 2021-06-01 RX ORDER — ONDANSETRON 2 MG/ML
INJECTION INTRAMUSCULAR; INTRAVENOUS PRN
Status: DISCONTINUED | OUTPATIENT
Start: 2021-06-01 | End: 2021-06-01

## 2021-06-01 RX ORDER — PROPOFOL 10 MG/ML
INJECTION, EMULSION INTRAVENOUS CONTINUOUS PRN
Status: DISCONTINUED | OUTPATIENT
Start: 2021-06-01 | End: 2021-06-01

## 2021-06-01 RX ORDER — DEXAMETHASONE SODIUM PHOSPHATE 10 MG/ML
INJECTION, SOLUTION INTRAMUSCULAR; INTRAVENOUS PRN
Status: DISCONTINUED | OUTPATIENT
Start: 2021-06-01 | End: 2021-06-01

## 2021-06-01 RX ORDER — PREDNISONE 10 MG/1
TABLET ORAL
Qty: 21 TABLET | Refills: 0 | Status: SHIPPED | OUTPATIENT
Start: 2021-06-01 | End: 2021-06-15

## 2021-06-01 RX ORDER — ONDANSETRON 2 MG/ML
4 INJECTION INTRAMUSCULAR; INTRAVENOUS EVERY 30 MIN PRN
Status: DISCONTINUED | OUTPATIENT
Start: 2021-06-01 | End: 2021-06-01 | Stop reason: HOSPADM

## 2021-06-01 RX ORDER — NALOXONE HYDROCHLORIDE 0.4 MG/ML
0.4 INJECTION, SOLUTION INTRAMUSCULAR; INTRAVENOUS; SUBCUTANEOUS
Status: DISCONTINUED | OUTPATIENT
Start: 2021-06-01 | End: 2021-06-01 | Stop reason: HOSPADM

## 2021-06-01 RX ORDER — FENTANYL CITRATE 50 UG/ML
25-50 INJECTION, SOLUTION INTRAMUSCULAR; INTRAVENOUS EVERY 5 MIN PRN
Status: DISCONTINUED | OUTPATIENT
Start: 2021-06-01 | End: 2021-06-01 | Stop reason: HOSPADM

## 2021-06-01 RX ORDER — SODIUM CHLORIDE, SODIUM LACTATE, POTASSIUM CHLORIDE, CALCIUM CHLORIDE 600; 310; 30; 20 MG/100ML; MG/100ML; MG/100ML; MG/100ML
INJECTION, SOLUTION INTRAVENOUS CONTINUOUS
Status: DISCONTINUED | OUTPATIENT
Start: 2021-06-01 | End: 2021-06-01 | Stop reason: HOSPADM

## 2021-06-01 RX ORDER — LIDOCAINE 40 MG/G
CREAM TOPICAL
Status: DISCONTINUED | OUTPATIENT
Start: 2021-06-01 | End: 2021-06-01 | Stop reason: HOSPADM

## 2021-06-01 RX ORDER — PREDNISONE 10 MG/1
TABLET ORAL
Qty: 21 TABLET | Refills: 0 | Status: SHIPPED | OUTPATIENT
Start: 2021-06-02 | End: 2021-06-25

## 2021-06-01 RX ORDER — LABETALOL 20 MG/4 ML (5 MG/ML) INTRAVENOUS SYRINGE
10
Status: DISCONTINUED | OUTPATIENT
Start: 2021-06-01 | End: 2021-06-01 | Stop reason: HOSPADM

## 2021-06-01 RX ORDER — TRIAMCINOLONE ACETONIDE 40 MG/ML
INJECTION, SUSPENSION INTRA-ARTICULAR; INTRAMUSCULAR PRN
Status: DISCONTINUED | OUTPATIENT
Start: 2021-06-01 | End: 2021-06-01 | Stop reason: HOSPADM

## 2021-06-01 RX ORDER — COCAINE HYDROCHLORIDE 40 MG/ML
SOLUTION NASAL PRN
Status: DISCONTINUED | OUTPATIENT
Start: 2021-06-01 | End: 2021-06-01 | Stop reason: HOSPADM

## 2021-06-01 RX ORDER — KETAMINE HYDROCHLORIDE 10 MG/ML
INJECTION INTRAMUSCULAR; INTRAVENOUS PRN
Status: DISCONTINUED | OUTPATIENT
Start: 2021-06-01 | End: 2021-06-01

## 2021-06-01 RX ORDER — MEPERIDINE HYDROCHLORIDE 25 MG/ML
12.5 INJECTION INTRAMUSCULAR; INTRAVENOUS; SUBCUTANEOUS
Status: DISCONTINUED | OUTPATIENT
Start: 2021-06-01 | End: 2021-06-01 | Stop reason: HOSPADM

## 2021-06-01 RX ORDER — ONDANSETRON 4 MG/1
4 TABLET, ORALLY DISINTEGRATING ORAL EVERY 30 MIN PRN
Status: DISCONTINUED | OUTPATIENT
Start: 2021-06-01 | End: 2021-06-01 | Stop reason: HOSPADM

## 2021-06-01 RX ORDER — FENTANYL CITRATE 50 UG/ML
INJECTION, SOLUTION INTRAMUSCULAR; INTRAVENOUS PRN
Status: DISCONTINUED | OUTPATIENT
Start: 2021-06-01 | End: 2021-06-01

## 2021-06-01 RX ORDER — LIDOCAINE HYDROCHLORIDE AND EPINEPHRINE 10; 10 MG/ML; UG/ML
INJECTION, SOLUTION INFILTRATION; PERINEURAL PRN
Status: DISCONTINUED | OUTPATIENT
Start: 2021-06-01 | End: 2021-06-01 | Stop reason: HOSPADM

## 2021-06-01 RX ORDER — PROPOFOL 10 MG/ML
INJECTION, EMULSION INTRAVENOUS PRN
Status: DISCONTINUED | OUTPATIENT
Start: 2021-06-01 | End: 2021-06-01

## 2021-06-01 RX ORDER — COCAINE HYDROCHLORIDE 40 MG/ML
SOLUTION NASAL
Status: DISCONTINUED
Start: 2021-06-01 | End: 2021-06-01 | Stop reason: HOSPADM

## 2021-06-01 RX ORDER — OXYMETAZOLINE HYDROCHLORIDE 0.05 G/100ML
3 SPRAY NASAL
Status: COMPLETED | OUTPATIENT
Start: 2021-06-01 | End: 2021-06-01

## 2021-06-01 RX ORDER — ALBUTEROL SULFATE 0.83 MG/ML
2.5 SOLUTION RESPIRATORY (INHALATION) EVERY 4 HOURS PRN
Status: DISCONTINUED | OUTPATIENT
Start: 2021-06-01 | End: 2021-06-01 | Stop reason: HOSPADM

## 2021-06-01 RX ADMIN — FENTANYL CITRATE 50 MCG: 50 INJECTION, SOLUTION INTRAMUSCULAR; INTRAVENOUS at 15:26

## 2021-06-01 RX ADMIN — Medication 100 MG: at 12:13

## 2021-06-01 RX ADMIN — PROPOFOL 100 MG: 10 INJECTION, EMULSION INTRAVENOUS at 12:15

## 2021-06-01 RX ADMIN — SODIUM CHLORIDE, POTASSIUM CHLORIDE, SODIUM LACTATE AND CALCIUM CHLORIDE: 600; 310; 30; 20 INJECTION, SOLUTION INTRAVENOUS at 09:56

## 2021-06-01 RX ADMIN — PROPOFOL 50 MCG/KG/MIN: 10 INJECTION, EMULSION INTRAVENOUS at 12:30

## 2021-06-01 RX ADMIN — FENTANYL CITRATE 50 MCG: 50 INJECTION, SOLUTION INTRAMUSCULAR; INTRAVENOUS at 15:54

## 2021-06-01 RX ADMIN — MIDAZOLAM 2 MG: 1 INJECTION INTRAMUSCULAR; INTRAVENOUS at 12:02

## 2021-06-01 RX ADMIN — FENTANYL CITRATE 100 MCG: 50 INJECTION, SOLUTION INTRAMUSCULAR; INTRAVENOUS at 12:10

## 2021-06-01 RX ADMIN — FENTANYL CITRATE 50 MCG: 50 INJECTION, SOLUTION INTRAMUSCULAR; INTRAVENOUS at 13:34

## 2021-06-01 RX ADMIN — DEXAMETHASONE SODIUM PHOSPHATE 12 MG: 10 INJECTION, SOLUTION INTRAMUSCULAR; INTRAVENOUS at 12:53

## 2021-06-01 RX ADMIN — KETAMINE HYDROCHLORIDE 30 MG: 10 INJECTION, SOLUTION INTRAMUSCULAR; INTRAVENOUS at 12:17

## 2021-06-01 RX ADMIN — FENTANYL CITRATE 50 MCG: 50 INJECTION, SOLUTION INTRAMUSCULAR; INTRAVENOUS at 13:45

## 2021-06-01 RX ADMIN — ONDANSETRON 4 MG: 2 INJECTION INTRAMUSCULAR; INTRAVENOUS at 14:11

## 2021-06-01 RX ADMIN — SODIUM CHLORIDE, POTASSIUM CHLORIDE, SODIUM LACTATE AND CALCIUM CHLORIDE: 600; 310; 30; 20 INJECTION, SOLUTION INTRAVENOUS at 14:05

## 2021-06-01 RX ADMIN — OXYMETAZOLINE HCL 3 SPRAY: 0.05 SPRAY NASAL at 09:40

## 2021-06-01 RX ADMIN — PROPOFOL 150 MG: 10 INJECTION, EMULSION INTRAVENOUS at 12:12

## 2021-06-01 RX ADMIN — OXYMETAZOLINE HCL 3 SPRAY: 0.05 SPRAY NASAL at 09:30

## 2021-06-01 RX ADMIN — OXYMETAZOLINE HCL 3 SPRAY: 0.05 SPRAY NASAL at 09:50

## 2021-06-01 RX ADMIN — PROPOFOL 50 MG: 10 INJECTION, EMULSION INTRAVENOUS at 12:22

## 2021-06-01 RX ADMIN — ACETAMINOPHEN AND CODEINE PHOSPHATE 1 TABLET: 300; 30 TABLET ORAL at 16:38

## 2021-06-01 ASSESSMENT — MIFFLIN-ST. JEOR: SCORE: 1487.56

## 2021-06-01 ASSESSMENT — LIFESTYLE VARIABLES: TOBACCO_USE: 1

## 2021-06-01 NOTE — ANESTHESIA CARE TRANSFER NOTE
Patient: Elaine Lara    Procedure(s):  bilateral endoscopic sinus surgery with polypectomy; extended maxillary antrostomy  Turbinate reduction    Diagnosis: Chronic pansinusitis [J32.4]  Nasal polyp [J33.9]  Anosmia [R43.0]  Diagnosis Additional Information: No value filed.    Anesthesia Type:   General     Note:    Oropharynx: oropharynx clear of all foreign objects  Level of Consciousness: awake  Oxygen Supplementation: face mask  Level of Supplemental Oxygen (L/min / FiO2): 6  Independent Airway: airway patency satisfactory and stable  Dentition: dentition unchanged  Vital Signs Stable: post-procedure vital signs reviewed and stable  Report to RN Given: handoff report given  Patient transferred to: PACU    Handoff Report: Identifed the Patient, Identified the Reponsible Provider, Reviewed the pertinent medical history, Discussed the surgical course, Reviewed Intra-OP anesthesia mangement and issues during anesthesia, Set expectations for post-procedure period and Allowed opportunity for questions and acknowledgement of understanding      Vitals: (Last set prior to Anesthesia Care Transfer)  CRNA VITALS  6/1/2021 1415 - 6/1/2021 1449      6/1/2021             Resp Rate (observed):  14        Electronically Signed By: FRANCISCO Bowling CRNA  June 1, 2021  2:49 PM

## 2021-06-01 NOTE — OR NURSING
Patient and responsible adult given discharge instructions with no questions regarding instructions. Fifi score 19/20. Pain level 2/10.  Discharged from unit via wheelchair. Patient discharged to home with  maria d.

## 2021-06-01 NOTE — ANESTHESIA POSTPROCEDURE EVALUATION
Patient: Elaine Lara    Procedure(s):  bilateral endoscopic sinus surgery with polypectomy; extended maxillary antrostomy  Turbinate reduction    Diagnosis:Chronic pansinusitis [J32.4]  Nasal polyp [J33.9]  Anosmia [R43.0]  Diagnosis Additional Information: No value filed.    Anesthesia Type:  General    Note:  Disposition: Outpatient   Postop Pain Control: Uneventful            Sign Out: Well controlled pain   PONV: No   Neuro/Psych: Uneventful            Sign Out: Acceptable/Baseline neuro status   Airway/Respiratory: Uneventful            Sign Out: Acceptable/Baseline resp. status   CV/Hemodynamics: Uneventful            Sign Out: Acceptable CV status; No obvious hypovolemia; No obvious fluid overload   Other NRE: NONE   DID A NON-ROUTINE EVENT OCCUR? No           Last vitals:  Vitals:    06/01/21 1615 06/01/21 1620 06/01/21 1630   BP: 131/77  140/73   Pulse: 87  76   Resp:   16   Temp:      SpO2: 93% 93% 94%       Last vitals prior to Anesthesia Care Transfer:  CRNA VITALS  6/1/2021 1415 - 6/1/2021 1515      6/1/2021             Resp Rate (observed):  14          Electronically Signed By: FRANCISCO Navarrete CRNA  June 1, 2021  4:47 PM

## 2021-06-01 NOTE — ANESTHESIA PROCEDURE NOTES
Airway       Patient location during procedure: OR  Staff -        CRNA: Cristy Rodriguez APRN CRNA       Performed By: anesthesiologist  Consent for Airway        Urgency: elective  Indications and Patient Condition       Indications for airway management: ana-procedural        Final Airway Details       Final airway type: endotracheal airway       Successful airway: ETT - single and SHAHID  Endotracheal Airway Details        ETT size (mm): 7.0       Cuffed: yes       Successful intubation technique: direct laryngoscopy and video laryngoscopy       DL Blade Type: Wyman 2       VL Blade Size: MAC 3       Grade View of Cords: 2       Adjucts: stylet       Position: Center       Bite block used: None    Post intubation assessment        Placement verified by: capnometry and equal breath sounds        Number of attempts at approach: 2       Secured with: plastic tape       Ease of procedure: easy       Dentition: Intact and Lips/oral mucosa injury (small upper lip lac, ointment applied)    Medication(s) Administered   Medication Administration Time: 6/1/2021 12:16 PM

## 2021-06-01 NOTE — DISCHARGE INSTRUCTIONS
Instructions for Sinus Surgery    Recovery - Everyone recovers differently from a general anesthetic.  Symptoms such as fatigue, nausea, light-headedness, and sometimes a low grade fever (up to 100 degrees) are not unusual.  As your body removes the anesthetic drugs from circulation, these symptoms will resolve.  Your nose will be sore after surgery, and you may even have symptoms similar to a sinus infection with headache, congestion, and pressure.  These will resolve with healing.  For several days you may experience bloody drainage from the nose, please use the drip pad as necessary for this.  If there is persistent bleeding, please call the office during business hours or the on call ENT physician after hours.  There are no diet restrictions after sinus surgery, and you can resume your home medications.      Please do not blow your nose until 1 week after surgery.  At 1 week you may gently blow your nose, unless otherwise indicated by Dr. Cowan.     Limit your activity to no strenuous activities until I see you for the first follow-up visit in approximately 2 weeks.      Medications - You were sent home with narcotic pain medication.  If you can tolerate the discomfort during your recovery by using just plain Tylenol please do so.  However, do not hesitate to use the stronger pain medication (tylenol #3) if needed.  If you were sent home with an antibiotic, it is primarily used to help the healing process.  If it causes loose bowel movements or other signs of intolerance, it is appropriate to discontinue it.  By far the most important measure you can take to speed recovery, and maximize the chances of long term success of sinus surgery is using the sinus rinses at least three time per day for the first month after surgery.       Start Uzma Med saline irrigation tomorrow and use at least 5 times daily.     I recommend 2 uzma med bottles, one to add the budesonide to and irrigate with the budesonide rinse  twice a day for 2 months.    In the other uzma med bottle use only the saline solution, and irrigate with this at least 3 additional times daily.    Perform gentle irrigation for the first week.  Starting 1 week after surgery, you should increase the volume of uzma med saline irrigation to each nostril, continuing to use the rinses in an alternating fashion at least 5 times daily.  You cannot use too much of the uzma med saline, but please limit budesonide rinses to twice daily.    At 2 weeks after surgery, you may also restart nasal steroids (flonase, nasonex, etc).        Complications - Problems related to sinus surgery almost always are detected during the operation, and special instruction will be given in that situation.  However, unexpected things can happen, and are all related to the structures around the sinus cavities.  Symptoms that should alert you to a possible problem include: severe eye pain or eye swelling, persistent heavy bleeding from the nose, and high fevers with headache and neck pain.  Any of these symptoms should be called into my office or to the on call ENT if after hours.  The most common non-emergency complication of sinus surgery is the formation of scar tissue which can re-block the sinuses.  This is addressed below.    Follow-up -  As you have noted, there are quite a few follow-up visits after sinus surgery.  This is done to aggressively manage the most common complication of this technique, which is scar tissue blocking the sinuses.  These visits will require the examination of your nose and possibly removal of crusts of dry mucous and blood, with possible removal of early scar tissue.  Please prepare for these visits by using your sinus rinses.    If there are any questions or issues with the above, or if there are other issues that concern you, always feel free to call the clinic and I am happy to speak with you as soon as I can.    Afua Cowan,  MER  Otolaryngology/Head and Neck Surgery  Allergy    [phone] office        Post-Anesthesia Patient Instructions    IMMEDIATELY FOLLOWING SURGERY:  Do not drive or operate machinery for the first twenty four hours after surgery.  Do not make any important decisions for twenty four hours after surgery or while taking narcotic pain medications or sedatives.  If you develop intractable nausea and vomiting or a severe headache please notify your doctor immediately.    FOLLOW-UP:  Please make an appointment with your surgeon as instructed. You do not need to follow up with anesthesia unless specifically instructed to do so.    WOUND CARE INSTRUCTIONS (if applicable):  Keep a dry clean dressing on the anesthesia/puncture wound site if there is drainage.  Once the wound has quit draining you may leave it open to air.  Generally you should leave the bandage intact for twenty four hours unless there is drainage.  If the epidural site drains for more than 36-48 hours please call the anesthesia department.    QUESTIONS?:  Please feel free to call your physician or the hospital  if you have any questions, and they will be happy to assist you.

## 2021-06-01 NOTE — OP NOTE
Otolaryngology Operative Note     Pre-op Diagnosis: Chronic recurrent sinusitis with nasal polyps, aspirin exacerbated respiratory disease, bilateral inferior turbinate hypertrophy  Post-op Diagnosis:  same  Procedures:    1.  Bilateral extended maxillary antrostomy with polypectomy  2.  Bilateral revision total ethmoidectomy  3.  Bilateral revision sphenoidotomy  4.  Left frontal sinusotomy  5.  Bilateral submucous reduction inferior turbinates  6.  Modifier 22 for extra 1 hour operating time due to diffuse polyposis and prior surgery  Sinus procedures performed with [employer] navigation    Surgeon:  Afua Cowan D.O.  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  The patient was administered a general anesthetic by a member of anesthesia.  The table was turned 180 degrees.  The patient was draped in the normal clean fashion and a timeout was taken.  The bed was placed into reverse Trendelenburg positioning.  A timeout was taken.  Cocaine pledgets were placed into the nares for several minutes and removed.  The lateral nasal wall, middle turbinates and inferior turbinates were anesthetized with 1% lidocaine with 1-100,000 epinephrine.  I first turned my attention the turbinate reduction.  A stab incision was made at the anterior edge of the infra turbinates bilaterally.  The Medtronic turbinate blade was placed and advanced submucosally.  Submucosal reduction was performed bilaterally and the turbinates were outfractured.    Next I used a 3 degree endoscope and starting on the left side removed polyps filling the former  ethmoid cavity until he was able to identify the superior attachment of the middle turbinate and the inferior portion of the turbinate.  The middle turbinate is severely polypoid.  I used conchal scissors to remove most of the middle turbinate which had severe polypoid degeneration.    Next identified the lamina.  The ethmoid mucosa against the lamina has severe polypoid degeneration.  Polypoid mucosa was removed with Griffin-Cut forceps down to normal periosteum.  The orbit was preserved throughout.  Adrenaline pledgets were placed intermittently throughout the procedure and removed for a clear operating field.     I then identified the natural maxillary ostia which was identified with a curved suction on navigation.  The former antrostomy was enlarged with the microdebrider blade.  Using a 30 degree endoscope I visualized the maxillary sinus which is completely obstructed with a large polyp.  There is diffuse severe polypoid degeneration of the remainder of the sinus mucosa.  Therefore an extended maxillary antrostomy was performed using backbiting Blakesley's and conchal scissors I removed the middle third of the inferior turbinate.  I was unable to use maxillary sinus forceps to remove polyps and polypoid mucosa.  The sinus was irrigated and gently suctioned.  Cultures were collected throughout of allergic fungal appearing mucin and thick stringy purulent secretions.    Next I continued dissecting within the ethmoid cavity intermittently placing adrenaline pledgets which were left in place for several minutes and removed.  Due to prior surgery and diffuse polyposis surgery took an extra half an hour on each side using this technique for visualization.    I then identified the skull base and continued removing polyps from a posterior to anterior fashion with Griffin-Cut forceps and the microdebrider blade.  I worked my way up to the frontal recess which had diffuse polyposis.  Polyps removed with up-biting Blakesley's.   I used a curved suction to identify the hypoplastic left frontal sinus which was opened with a curved suction on navigation.  Viscous purulent secretions were removed with a curved suction.  The frontal sinus was irrigated Ancef and saline stasis is adequate.    I then turned my attention to the face of the sphenoid sinus.  The sphenoid was penetrated inferior medial with a straight suction and polyps were removed from the remainder of the posterior ethmoid cavity with the microdebrider blade.  I then reentered the sphenoid sinus with a straight suction the sphenoid is clear the mucosa is polypoid.  The sinuses are irrigated and hemostasis is adequate.      At this point I turned my attention to the right side similarly performing an extended right maxillary antrostomy with polypectomy revision total ethmoidectomy and sphenoidotomy.  The frontal sinus is aplastic on the right side.  Diffuse severe polypoid degeneration and obstructive polyps were similarly noted and intermittent placement of adrenaline pledgets was used for hemostasis with an extra half an hour operating time on the right side.    I used suction cautery against the resected portion of the inferior turbinates bilaterally.    Hemostasis is adequate.  I placed nasopore soaked in Kenalog into the ethmoid cavity bilaterally.    She was handed back over to anesthesia was awakened and brought to recovery room in stable condition having tolerated the procedure well.

## 2021-06-01 NOTE — PROGRESS NOTES
Patient's  changed his mind where he would like medications sent to.  Was unable to discontinue medications in Epic, Reena Kaplan called and discontinued prescription at [facility] in [place] and new medications sent to [facility].  JS

## 2021-06-02 LAB
GRAM STN SPEC: ABNORMAL
GRAM STN SPEC: ABNORMAL
SPECIMEN SOURCE: ABNORMAL

## 2021-06-06 DIAGNOSIS — Z98.890 S/P FESS (FUNCTIONAL ENDOSCOPIC SINUS SURGERY): Primary | ICD-10-CM

## 2021-06-06 LAB
BACTERIA SPEC CULT: ABNORMAL
SPECIMEN SOURCE: ABNORMAL

## 2021-06-06 RX ORDER — LEVOFLOXACIN 500 MG/1
500 TABLET, FILM COATED ORAL DAILY
Qty: 14 TABLET | Refills: 0 | Status: SHIPPED | OUTPATIENT
Start: 2021-06-06 | End: 2021-06-20

## 2021-06-08 LAB
BACTERIA SPEC CULT: ABNORMAL
BACTERIA SPEC CULT: ABNORMAL
SPECIMEN SOURCE: ABNORMAL

## 2021-06-09 LAB — COPATH REPORT: NORMAL

## 2021-06-15 ENCOUNTER — OFFICE VISIT (OUTPATIENT)
Dept: OTOLARYNGOLOGY | Facility: OTHER | Age: 66
End: 2021-06-15
Attending: NURSE PRACTITIONER
Payer: COMMERCIAL

## 2021-06-15 VITALS
SYSTOLIC BLOOD PRESSURE: 128 MMHG | TEMPERATURE: 98.5 F | OXYGEN SATURATION: 98 % | BODY MASS INDEX: 39.93 KG/M2 | HEIGHT: 62 IN | HEART RATE: 67 BPM | DIASTOLIC BLOOD PRESSURE: 74 MMHG | WEIGHT: 217 LBS

## 2021-06-15 DIAGNOSIS — Z98.890 S/P FESS (FUNCTIONAL ENDOSCOPIC SINUS SURGERY): Primary | ICD-10-CM

## 2021-06-15 PROCEDURE — 31231 NASAL ENDOSCOPY DX: CPT | Mod: 58 | Performed by: NURSE PRACTITIONER

## 2021-06-15 PROCEDURE — 99212 OFFICE O/P EST SF 10 MIN: CPT | Mod: 25 | Performed by: NURSE PRACTITIONER

## 2021-06-15 ASSESSMENT — MIFFLIN-ST. JEOR: SCORE: 1487.56

## 2021-06-15 ASSESSMENT — PAIN SCALES - GENERAL: PAINLEVEL: NO PAIN (0)

## 2021-06-15 NOTE — PROGRESS NOTES
Otolaryngology Note         Chief Complaint:     Patient presents with:  Surgical Followup: S/p 06/01  FESS/ TR            History of Present Illness:     Elaine Lara is a 66 year old female who presents today for her first post op FESS appointment.  The patient has done well this past 2 weeks.  There has been some serosanginous drainage from each nare.  There has been a complaints in regards to occasional headaches and crusting.  There has been no fever, chills, large amounts of bleeding, visual changes.  She has been having a slight stiff neck that has been improving.    Has been using budesonide rinses 2 times per day and tolerating well, she is also doing plain rinses 2-3 time per day.    She was started on Dupixent, had her second injection last week, has been tolerating well.      She has had previous allergy testing completed in the past with Dr Johnson at [facility] in [place] in April 2021.  She was on allergy immunotherapy about 20 years ago, she did not have any improvement so she quit.  She did not start immunotherapy after testing completed in [place] in April 2021.  We have requested records.          Surgical Details:      Otolaryngology Operative Note 6/1/21:     Pre-op Diagnosis: Chronic recurrent sinusitis with nasal polyps, aspirin exacerbated respiratory disease, bilateral inferior turbinate hypertrophy  Post-op Diagnosis:  same  Procedures:    1.  Bilateral extended maxillary antrostomy with polypectomy  2.  Bilateral revision total ethmoidectomy  3.  Bilateral revision sphenoidotomy  4.  Left frontal sinusotomy  5.  Bilateral submucous reduction inferior turbinates  6.  Modifier 22 for extra 1 hour operating time due to diffuse polyposis and prior surgery  Sinus procedures performed with [employer] navigation     Surgeon:  Afua Cowan D.O.  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  Do this twice daily. 2 mL 11     citalopram (CELEXA) 20 MG tablet Take 20 mg by mouth daily       Dupilumab 300 MG/2ML SOPN Inject 300 mg Subcutaneous every 14 days 4 mL 5     EPINEPHrine (ANY BX GENERIC EQUIV) 0.3 MG/0.3ML injection 2-pack Inject 0.3 mLs (0.3 mg) into the muscle once as needed 2 each 11     fluticasone-salmeterol (ADVAIR HFA) 45-21 MCG/ACT inhaler Apply 2 puffs into one nostril alternating nostrils daily       hydrochlorothiazide (HYDRODIURIL) 12.5 MG tablet Take 12.5 mg by mouth daily       levofloxacin (LEVAQUIN) 500 MG tablet Take 1 tablet (500 mg) by mouth daily for 14 days 14 tablet 0     mometasone (ELOCON) 0.1 % external ointment Apply topically daily Apply to ear canals twice a day for 2 weeks and then as needed 45 g 1     montelukast (SINGULAIR) 10 MG tablet Take 10 mg by mouth daily       predniSONE (DELTASONE) 10 MG tablet Take 2 tablets (20 mg) by mouth daily for 5 days, THEN 1 tablet (10 mg) daily for 4 days, THEN 0.5 tablets (5 mg) daily for 14 days. 21 tablet 0     Turmeric (QC TUMERIC COMPLEX) 500 MG CAPS Take 500 mg by mouth 2 times daily              Allergies:     Allergies: Ibuprofen, Sulfamethoxazole w/trimethoprim, Hydrocodone-acetaminophen, and Acetaminophen          Past Medical History:     History reviewed. No pertinent past medical history.         Past Surgical History:     Past Surgical History:   Procedure Laterality Date     ENDOSCOPIC SINUS SURGERY N/A 6/1/2021    Procedure: bilateral endoscopic sinus surgery with polypectomy; extended maxillary antrostomy;  Surgeon: Camryn  "MD Afua;  Location: [facility] OR     TURBINOPLASTY Bilateral 6/1/2021    Procedure: Turbinate reduction;  Surgeon: Afua Cowan MD;  Location: [facility] OR       ENT family history reviewed         Social History:     Social History     Tobacco Use     Smoking status: Never Smoker     Smokeless tobacco: Never Used   Substance Use Topics     Alcohol use: None     Drug use: None            Review of Systems:     ROS: See HPI         Physical Exam:     /74 (Cuff Size: Adult Large)   Pulse 67   Temp 98.5  F (36.9  C) (Tympanic)   Ht 1.575 m (5' 2\")   Wt 98.4 kg (217 lb)   SpO2 98%   BMI 39.69 kg/m      General - The patient is well nourished and well developed, and appears to have good nutritional status.  Alert and oriented to person and place, answers questions and cooperates with examination appropriately.   Head and Face - Normocephalic and atraumatic, with no gross asymmetry noted.  The facial nerve is intact, with strong symmetric movements.  Voice and Breathing - The patient was breathing comfortably without the use of accessory muscles. There was no wheezing, stridor. The patients voice was clear and strong, and had appropriate pitch and quality.  Ears - External ear normal. Canals are patent. Right tympanic membrane is intact without effusion, retraction or mass. Left tympanic membrane is intact without effusion, retraction or mass.   Mouth - Examination of the oral cavity showed pink, healthy oral mucosa. Dentition in good condition. No lesions or ulcerations noted. The tongue was mobile and midline.   Throat - The walls of the oropharynx were smooth, pink, moist, symmetric, and had no lesions or ulcerations.  The tonsillar pillars and soft palate were symmetric. The uvula was midline on elevation.    Neck - Normal midline excursion of the laryngotracheal complex during swallowing.  Full range of motion on passive movement.  Palpation of the occipital, submental, submandibular, internal jugular " chain, and supraclavicular nodes did not demonstrate any abnormal lymph nodes or masses.  Palpation of the thyroid was soft and smooth, with no nodules or goiter appreciated.  The trachea was mobile and midline.  Nose - External contour is symmetric, no gross deflection or scars.  Nasal mucosa is pink and moist with no abnormal mucus.  The septum and turbinates were evaluated with nasal speculum, there is crusting in the left naris    To evaluate the nose and sinuses in the post operative state, I performed rigid nasal endoscopy.  I sprayed both nares with 2 sprays lidocaine and neosynephrine.     I began with the RIGHT side using a 0 degree rigid nasal endoscope, and then similarly examined the LEFT side     Findings: There is no active bleeding and no sign of septal perforation or hematoma.  There is no webbing of mucosa and each ostia is seen without sign of drainage.  The cavities are well mucosalized.  Bilateral maxillary antrostomies are patent, no polypoid change  The ethmoid cavities are healing nicely, no polypoid change  Minimal suctioning of crusting completed on left side only.  The patient tolerated the procedure well            Assessment and Plan:       ICD-10-CM    1. S/P FESS (functional endoscopic sinus surgery)  Z98.890      Continue Budesonide nasal saline irrigation per instructions:  -Obtain Uzma Med Sinus rinse over the counter.    -Use warm distilled water and 2 packets of the salt solution that comes with the bottle, dissolve in bottle up to the 240 mL desiree.  -Add 1 vial of budesonide.  -Irrigate each side of your nose leaning over the sink, using 1/3 to 1/2 the volume of the bottle in each nostril every irrigation.  Irrigate 2 times daily.  -If additional rinses are needed/recommended, you may use the plan Uzma Med Sinus irrigation without the use of added budesonide.     Continue Uzma Med Nasal Saline  Use high volume uzma med saline irrigation.  Use warm distilled water and 2 packets of  the salt solution that comes with the bottle, dissolve in bottle up to 240 ml desiree.  Irrigate each side of your nose leaning over the sink, using 1/3 to 1/2 the volume of the bottle in each nostril every irrigation.  Irrigate 3 times daily and as needed.    Continue Dupixent     I gave you a Return to Work letter   I had you sign a Release of Information for Dr. Gomez 04/21/2021 appointment     Donita GRECO  [facility] ENT  1:14 PM  Krupa 15, 2021

## 2021-06-15 NOTE — NURSING NOTE
"Chief Complaint   Patient presents with     Surgical Followup     S/p 06/01  FESS/ TR        Initial /74 (Cuff Size: Adult Large)   Pulse 67   Temp 98.5  F (36.9  C) (Tympanic)   Ht 1.575 m (5' 2\")   Wt 98.4 kg (217 lb)   SpO2 98%   BMI 39.69 kg/m   Estimated body mass index is 39.69 kg/m  as calculated from the following:    Height as of this encounter: 1.575 m (5' 2\").    Weight as of this encounter: 98.4 kg (217 lb).  Medication Reconciliation: complete  Fatmata Campa LPN    "

## 2021-06-15 NOTE — PATIENT INSTRUCTIONS
Thank you for allowing Donita Anthony and our ENT team to participate in your care.  If your medications are too expensive, please give the nurse a call.  We can possibly change this medication.  If you have a scheduling or an appointment question please contact our Health Unit Coordinator at their direct line [phone].   ALL nursing questions or concerns can be directed to your ENT nurse at: [phone] - Mercedes      Continue Budesonide nasal saline irrigation per instructions:  -Obtain Uzma Med Sinus rinse over the counter.    -Use warm distilled water and 2 packets of the salt solution that comes with the bottle, dissolve in bottle up to the 240 mL desiree.  -Add 1 vial of budesonide.  -Irrigate each side of your nose leaning over the sink, using 1/3 to 1/2 the volume of the bottle in each nostril every irrigation.  Irrigate 2 times daily.  -If additional rinses are needed/recommended, you may use the plan Uzma Med Sinus irrigation without the use of added budesonide.     Continue Uzma Med Nasal Saline  Use high volume uzma med saline irrigation.  Use warm distilled water and 2 packets of the salt solution that comes with the bottle, dissolve in bottle up to 240 ml desiree.  Irrigate each side of your nose leaning over the sink, using 1/3 to 1/2 the volume of the bottle in each nostril every irrigation.  Irrigate 3 times daily and as needed.    Continue Dupixent     I gave you a Return to Work letter   I had you sign a Release of Information for Dr. Gomez 04/21/2021 appointment

## 2021-06-15 NOTE — LETTER
6/15/2021         RE: Elaine Lara  [address] [facility] [address]        Dear Colleague,    Thank you for referring your patient, Elaine Lara, to the [facility]. Please see a copy of my visit note below.      Otolaryngology Note         Chief Complaint:     Patient presents with:  Surgical Followup: S/p 06/01  FESS/ TR            History of Present Illness:     Elaine Lara is a 66 year old female who presents today for her first post op FESS appointment.  The patient has done well this past 2 weeks.  There has been some serosanginous drainage from each nare.  There has been a complaints in regards to occasional headaches and crusting.  There has been no fever, chills, large amounts of bleeding, visual changes.  She has been having a slight stiff neck that has been improving.    Has been using budesonide rinses 2 times per day and tolerating well, she is also doing plain rinses 2-3 time per day.    She was started on Dupixent, had her second injection last week, has been tolerating well.      She has had previous allergy testing completed in the past with Dr Johnson at [facility] in [place] in April 2021.  She was on allergy immunotherapy about 20 years ago, she did not have any improvement so she quit.  She did not start immunotherapy after testing completed in [place] in April 2021.  We have requested records.          Surgical Details:      Otolaryngology Operative Note 6/1/21:     Pre-op Diagnosis: Chronic recurrent sinusitis with nasal polyps, aspirin exacerbated respiratory disease, bilateral inferior turbinate hypertrophy  Post-op Diagnosis:  same  Procedures:    1.  Bilateral extended maxillary antrostomy with polypectomy  2.  Bilateral revision total ethmoidectomy  3.  Bilateral revision sphenoidotomy  4.  Left frontal sinusotomy  5.  Bilateral submucous reduction inferior turbinates  6.  Modifier 22 for extra 1 hour operating time due to diffuse polyposis and prior  surgery  Sinus procedures performed with [employer] navigation     Surgeon:  Afua Cowan D.O.  Anesthesia:  General endotracheal  EBL:  25 ml  Findings: Severe diffuse polyposis occlusive of the former ethmoid cavity and filling the maxillary sinuses  Hypoplastic left frontal sinus and aplastic right frontal sinus  Complications:  none  Condition:  stable            Medications:     Current Outpatient Rx   Medication Sig Dispense Refill     budesonide (PULMICORT) 0.5 MG/2ML neb solution Squirt entire vial into previously made uzma med saline bottle, mix, and irrigate each nostril until entire bottle empty.  Do this twice daily. 2 mL 11     citalopram (CELEXA) 20 MG tablet Take 20 mg by mouth daily       Dupilumab 300 MG/2ML SOPN Inject 300 mg Subcutaneous every 14 days 4 mL 5     EPINEPHrine (ANY BX GENERIC EQUIV) 0.3 MG/0.3ML injection 2-pack Inject 0.3 mLs (0.3 mg) into the muscle once as needed 2 each 11     fluticasone-salmeterol (ADVAIR HFA) 45-21 MCG/ACT inhaler Apply 2 puffs into one nostril alternating nostrils daily       hydrochlorothiazide (HYDRODIURIL) 12.5 MG tablet Take 12.5 mg by mouth daily       levofloxacin (LEVAQUIN) 500 MG tablet Take 1 tablet (500 mg) by mouth daily for 14 days 14 tablet 0     mometasone (ELOCON) 0.1 % external ointment Apply topically daily Apply to ear canals twice a day for 2 weeks and then as needed 45 g 1     montelukast (SINGULAIR) 10 MG tablet Take 10 mg by mouth daily       predniSONE (DELTASONE) 10 MG tablet Take 2 tablets (20 mg) by mouth daily for 5 days, THEN 1 tablet (10 mg) daily for 4 days, THEN 0.5 tablets (5 mg) daily for 14 days. 21 tablet 0     Turmeric (QC TUMERIC COMPLEX) 500 MG CAPS Take 500 mg by mouth 2 times daily              Allergies:     Allergies: Ibuprofen, Sulfamethoxazole w/trimethoprim, Hydrocodone-acetaminophen, and Acetaminophen          Past Medical History:     History reviewed. No pertinent past medical history.         Past  "Surgical History:     Past Surgical History:   Procedure Laterality Date     ENDOSCOPIC SINUS SURGERY N/A 6/1/2021    Procedure: bilateral endoscopic sinus surgery with polypectomy; extended maxillary antrostomy;  Surgeon: Afua Cowan MD;  Location: [facility] OR     TURBINOPLASTY Bilateral 6/1/2021    Procedure: Turbinate reduction;  Surgeon: Afua Cowan MD;  Location: [facility] OR       ENT family history reviewed         Social History:     Social History     Tobacco Use     Smoking status: Never Smoker     Smokeless tobacco: Never Used   Substance Use Topics     Alcohol use: None     Drug use: None            Review of Systems:     ROS: See HPI         Physical Exam:     /74 (Cuff Size: Adult Large)   Pulse 67   Temp 98.5  F (36.9  C) (Tympanic)   Ht 1.575 m (5' 2\")   Wt 98.4 kg (217 lb)   SpO2 98%   BMI 39.69 kg/m      General - The patient is well nourished and well developed, and appears to have good nutritional status.  Alert and oriented to person and place, answers questions and cooperates with examination appropriately.   Head and Face - Normocephalic and atraumatic, with no gross asymmetry noted.  The facial nerve is intact, with strong symmetric movements.  Voice and Breathing - The patient was breathing comfortably without the use of accessory muscles. There was no wheezing, stridor. The patients voice was clear and strong, and had appropriate pitch and quality.  Ears - External ear normal. Canals are patent. Right tympanic membrane is intact without effusion, retraction or mass. Left tympanic membrane is intact without effusion, retraction or mass.   Mouth - Examination of the oral cavity showed pink, healthy oral mucosa. Dentition in good condition. No lesions or ulcerations noted. The tongue was mobile and midline.   Throat - The walls of the oropharynx were smooth, pink, moist, symmetric, and had no lesions or ulcerations.  The tonsillar pillars and soft palate were symmetric. The " uvula was midline on elevation.    Neck - Normal midline excursion of the laryngotracheal complex during swallowing.  Full range of motion on passive movement.  Palpation of the occipital, submental, submandibular, internal jugular chain, and supraclavicular nodes did not demonstrate any abnormal lymph nodes or masses.  Palpation of the thyroid was soft and smooth, with no nodules or goiter appreciated.  The trachea was mobile and midline.  Nose - External contour is symmetric, no gross deflection or scars.  Nasal mucosa is pink and moist with no abnormal mucus.  The septum and turbinates were evaluated with nasal speculum, there is crusting in the left naris    To evaluate the nose and sinuses in the post operative state, I performed rigid nasal endoscopy.  I sprayed both nares with 2 sprays lidocaine and neosynephrine.     I began with the RIGHT side using a 0 degree rigid nasal endoscope, and then similarly examined the LEFT side     Findings: There is no active bleeding and no sign of septal perforation or hematoma.  There is no webbing of mucosa and each ostia is seen without sign of drainage.  The cavities are well mucosalized.  Bilateral maxillary antrostomies are patent, no polypoid change  The ethmoid cavities are healing nicely, no polypoid change  Minimal suctioning of crusting completed on left side only.  The patient tolerated the procedure well            Assessment and Plan:       ICD-10-CM    1. S/P FESS (functional endoscopic sinus surgery)  Z98.890      Continue Budesonide nasal saline irrigation per instructions:  -Obtain Aamir Med Sinus rinse over the counter.    -Use warm distilled water and 2 packets of the salt solution that comes with the bottle, dissolve in bottle up to the 240 mL desiree.  -Add 1 vial of budesonide.  -Irrigate each side of your nose leaning over the sink, using 1/3 to 1/2 the volume of the bottle in each nostril every irrigation.  Irrigate 2 times daily.  -If additional rinses  are needed/recommended, you may use the plan Uzma Med Sinus irrigation without the use of added budesonide.     Continue Uzma Med Nasal Saline  Use high volume uzma med saline irrigation.  Use warm distilled water and 2 packets of the salt solution that comes with the bottle, dissolve in bottle up to 240 ml desiree.  Irrigate each side of your nose leaning over the sink, using 1/3 to 1/2 the volume of the bottle in each nostril every irrigation.  Irrigate 3 times daily and as needed.    Continue Dupixent     I gave you a Return to Work letter   I had you sign a Release of Information for Dr. Gomez 04/21/2021 appointment     Donita GRECO  [facility]  1:14 PM  Krupa 15, 2021            Again, thank you for allowing me to participate in the care of your patient.        Sincerely,        Donita Cruz NP

## 2021-06-15 NOTE — LETTER
[facility] [place]  [address] [place] [address]  [phone]          Krupa 15, 2021    RE:  Elaine Lara                                                                                                                                                       [address] [place] [address]            To whom it may concern:    Elaine JEM Lara is under my professional care. She  may return to work.    Sincerely,        Donita Cruz, MELISAC

## 2021-06-28 ENCOUNTER — OFFICE VISIT (OUTPATIENT)
Dept: OTOLARYNGOLOGY | Facility: OTHER | Age: 66
End: 2021-06-28
Attending: OTOLARYNGOLOGY
Payer: COMMERCIAL

## 2021-06-28 VITALS
WEIGHT: 217 LBS | HEART RATE: 58 BPM | DIASTOLIC BLOOD PRESSURE: 62 MMHG | TEMPERATURE: 97.3 F | OXYGEN SATURATION: 98 % | SYSTOLIC BLOOD PRESSURE: 127 MMHG | HEIGHT: 62 IN | BODY MASS INDEX: 39.93 KG/M2

## 2021-06-28 DIAGNOSIS — J32.4 CHRONIC PANSINUSITIS: ICD-10-CM

## 2021-06-28 DIAGNOSIS — R43.0 ANOSMIA: ICD-10-CM

## 2021-06-28 DIAGNOSIS — J30.89 PERENNIAL ALLERGIC RHINITIS: Primary | ICD-10-CM

## 2021-06-28 PROBLEM — E66.01 MORBID OBESITY (H): Status: ACTIVE | Noted: 2021-06-28

## 2021-06-28 PROCEDURE — 99213 OFFICE O/P EST LOW 20 MIN: CPT | Mod: 25 | Performed by: OTOLARYNGOLOGY

## 2021-06-28 PROCEDURE — 31237 NSL/SINS NDSC SURG BX POLYPC: CPT | Performed by: OTOLARYNGOLOGY

## 2021-06-28 RX ORDER — BUDESONIDE 0.5 MG/2ML
INHALANT ORAL
Qty: 2 ML | Refills: 11 | Status: SHIPPED | OUTPATIENT
Start: 2021-06-28 | End: 2021-08-31

## 2021-06-28 RX ORDER — ALBUTEROL SULFATE 90 UG/1
AEROSOL, METERED RESPIRATORY (INHALATION)
COMMUNITY
Start: 2020-11-13

## 2021-06-28 ASSESSMENT — MIFFLIN-ST. JEOR: SCORE: 1487.56

## 2021-06-28 ASSESSMENT — PAIN SCALES - GENERAL: PAINLEVEL: NO PAIN (0)

## 2021-06-28 NOTE — PATIENT INSTRUCTIONS
Thank you for allowing Dr. Cowan and our ENT team to participate in your care.  If your medications are too expensive, please give the nurse a call.  We can possibly change this medication.  If you have a scheduling or an appointment question please contact our Health Unit Coordinator at their direct line [phone] ext [phone].   ALL nursing questions or concerns can be directed to your ENT nurse at: [phone] - [place]    Continue Budesonide Rinses Twice Daily  Continue Dupixent  Follow up in 2 months    Budesonide nasal saline irrigation per instructions:  -Obtain Aamir Med Sinus rinse over the counter.    -Use warm distilled water and 2 packets of the salt solution that comes with the bottle, dissolve in bottle up to the 240 mL desiree.  -Add 1 vial of budesonide.  -Irrigate each side of your nose leaning over the sink, using 1/3 to 1/2 the volume of the bottle in each nostril every irrigation.  Irrigate 2 times daily.  -If additional rinses are needed/recommended, you may use the plan Aamir Med Sinus irrigation without the use of added budesonide.

## 2021-06-28 NOTE — NURSING NOTE
"Chief Complaint   Patient presents with     Surgical Followup     S/P FESS with Polypectomy, Maxillary Antrostomy, Turbinate Reduction on 6/1/21       Initial /62 (BP Location: Left arm, Patient Position: Sitting, Cuff Size: Adult Large)   Pulse 58   Temp 97.3  F (36.3  C) (Tympanic)   Ht 1.575 m (5' 2\")   Wt 98.4 kg (217 lb)   SpO2 98%   BMI 39.69 kg/m   Estimated body mass index is 39.69 kg/m  as calculated from the following:    Height as of this encounter: 1.575 m (5' 2\").    Weight as of this encounter: 98.4 kg (217 lb).  Medication Reconciliation: complete     Julienne Barone LPN    "

## 2021-06-28 NOTE — PROGRESS NOTES
"Otolaryngology Progress Note          Elaine Lara is a 66 year old female    1 month status post endoscopic sinus surgery.    Procedures:    1.  Bilateral extended maxillary antrostomy with polypectomy  2.  Bilateral revision total ethmoidectomy  3.  Bilateral revision sphenoidotomy  4.  Left frontal sinusotomy  5.  Bilateral submucous reduction inferior turbinates  6.  Modifier 22 for extra 1 hour operating time due to diffuse polyposis and prior surgery  Sinus procedures performed with [employer] navigation    Findings: Severe diffuse polyposis occlusive of the former ethmoid cavity and filling the maxillary sinuses  Hypoplastic left frontal sinus and aplastic right frontal sinus    Final pathology shows eosinophilia.    She is on Dupixent, started 5/27/21  She is using budesonide bid, uzma med every day  Completed prednisone taper    She feels great  No heavy bleeding or pain  She has had anosmia for over 20 years and continues to have anosmia    2 weeks post op after her first day back at work, she developed left maxillary pressure with dentalgia followed by a left facial droop.  She had no other symptoms, did not go to the ED, and this self resolved in 5 days.    No prior hx bells or recurrent facial weakness  Denies history of stroke          Physical Exam  /62 (BP Location: Left arm, Patient Position: Sitting, Cuff Size: Adult Large)   Pulse 58   Temp 97.3  F (36.3  C) (Tympanic)   Ht 1.575 m (5' 2\")   Wt 98.4 kg (217 lb)   SpO2 98%   BMI 39.69 kg/m    General - The patient is well nourished and well developed, and appears to have good nutritional status.  Alert and oriented to person and place, interactive.  Head and Face - Normocephalic and atraumatic, with no gross asymmetry noted of the contour of the facial features.  The facial nerve is intact, with strong symmetric movements.  Grade 1/6 bilaterally  Neck-no palpable lymphadenopathy or thyroid mass.  Trachea is midline.  Eyes - Extraocular " movements intact.   Ears- External auditory canals are patent, tympanic membranes are intact without effusion or worrisome retractions   Nose - Nasal mucosa is pink and moist with no abnormal mucus.  The septum was grossly midline and non-obstructive, turbinates of normal size and position.  No polyps, masses, or purulence noted on examination.  Mouth - Examination of the oral cavity shows pink, healthy, moist mucosa.  No lesions or ulceration noted.  The dentition are in good repair.  The tongue is mobile and midline.  Throat - The walls of the oropharynx were smooth, pink, moist, symmetric, and had no lesions or ulcerations.  The tonsillar pillars and soft palate were symmetric.  The uvula was midline on elevation.      To evaluate the nose and sinuses in the post operative state, I performed rigid nasal endoscopy. The nose was anesthetized with home afrin or topical lidocaine and neosynephrine in the office.    I began with the LEFT side using a 0 degree rigid nasal endoscope, and then similarly examined the RIGHT side    Findings:  Inferior turbinates:  Lateralized  As of 8 Watts to suction do bride normal postoperative secretions from the nares.  I used a curved suction to debride the maxillary sinuses  Middle turbinate and middle meatus:  No purulence, no polyposis, no synechiae  Antrostomy patent  Ethmoid cavity patent  Mucosa has mild polypoid degeneration throughout  Silver nitrate cautery applied to some areas of oozing at the extended maxillary antrostomies bilaterally  She tolerated this well    Impression/Plan  Elaine Lara is a 66 year old female    ICD-10-CM    1. 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 Chronic anosmia  R43.0        Continue Budesonide Rinses Twice Daily  Continue Dupixent  Follow up in 2 months with Noemy Cruz NP  See me in 1  year  She looks great, need to follow closely for progression of polypoid degen  May require prednisone taper in future, d/w Elaine today    Budesonide nasal saline irrigation per instructions:  -Obtain Aamir Med Sinus rinse over the counter.    -Use warm distilled water and 2 packets of the salt solution that comes with the bottle, dissolve in bottle up to the 240 mL desiree.  -Add 1 vial of budesonide.  -Irrigate each side of your nose leaning over the sink, using 1/3 to 1/2 the volume of the bottle in each nostril every irrigation.  Irrigate 2 times daily.  -If additional rinses are needed/recommended, you may use the plan Aamir Med Sinus irrigation without the use of added budesonide.       Continue anosmia precautions    Complete SNOT and scan to media at 2 month follow-up     Afua Cowan D.O.  Otolaryngology/Head and Neck Surgery  Allergy

## 2021-06-28 NOTE — LETTER
"    6/28/2021         RE: Elaine Lara  [address]  [facility] [address]        Dear Colleague,    Thank you for referring your patient, Elaine Lara, to the [facility]. Please see a copy of my visit note below.    Otolaryngology Progress Note          Elaine Lara is a 66 year old female    1 month status post endoscopic sinus surgery.    Procedures:    1.  Bilateral extended maxillary antrostomy with polypectomy  2.  Bilateral revision total ethmoidectomy  3.  Bilateral revision sphenoidotomy  4.  Left frontal sinusotomy  5.  Bilateral submucous reduction inferior turbinates  6.  Modifier 22 for extra 1 hour operating time due to diffuse polyposis and prior surgery  Sinus procedures performed with [employer] navigation    Findings: Severe diffuse polyposis occlusive of the former ethmoid cavity and filling the maxillary sinuses  Hypoplastic left frontal sinus and aplastic right frontal sinus    Final pathology shows eosinophilia.    She is on Dupixent, started 5/27/21  She is using budesonide bid, uzma med every day  Completed prednisone taper    She feels great  No heavy bleeding or pain  She has had anosmia for over 20 years and continues to have anosmia    2 weeks post op after her first day back at work, she developed left maxillary pressure with dentalgia followed by a left facial droop.  She had no other symptoms, did not go to the ED, and this self resolved in 5 days.    No prior hx bells or recurrent facial weakness  Denies history of stroke          Physical Exam  /62 (BP Location: Left arm, Patient Position: Sitting, Cuff Size: Adult Large)   Pulse 58   Temp 97.3  F (36.3  C) (Tympanic)   Ht 1.575 m (5' 2\")   Wt 98.4 kg (217 lb)   SpO2 98%   BMI 39.69 kg/m    General - The patient is well nourished and well developed, and appears to have good nutritional status.  Alert and oriented to person and place, interactive.  Head and Face - Normocephalic and atraumatic, with no " gross asymmetry noted of the contour of the facial features.  The facial nerve is intact, with strong symmetric movements.  Grade 1/6 bilaterally  Neck-no palpable lymphadenopathy or thyroid mass.  Trachea is midline.  Eyes - Extraocular movements intact.   Ears- External auditory canals are patent, tympanic membranes are intact without effusion or worrisome retractions   Nose - Nasal mucosa is pink and moist with no abnormal mucus.  The septum was grossly midline and non-obstructive, turbinates of normal size and position.  No polyps, masses, or purulence noted on examination.  Mouth - Examination of the oral cavity shows pink, healthy, moist mucosa.  No lesions or ulceration noted.  The dentition are in good repair.  The tongue is mobile and midline.  Throat - The walls of the oropharynx were smooth, pink, moist, symmetric, and had no lesions or ulcerations.  The tonsillar pillars and soft palate were symmetric.  The uvula was midline on elevation.      To evaluate the nose and sinuses in the post operative state, I performed rigid nasal endoscopy. The nose was anesthetized with home afrin or topical lidocaine and neosynephrine in the office.    I began with the LEFT side using a 0 degree rigid nasal endoscope, and then similarly examined the RIGHT side    Findings:  Inferior turbinates:  Lateralized  As of 8 Watts to suction do bride normal postoperative secretions from the nares.  I used a curved suction to debride the maxillary sinuses  Middle turbinate and middle meatus:  No purulence, no polyposis, no synechiae  Antrostomy patent  Ethmoid cavity patent  Mucosa has mild polypoid degeneration throughout  Silver nitrate cautery applied to some areas of oozing at the extended maxillary antrostomies bilaterally  She tolerated this well    Impression/Plan  Elaine Lara is a 66 year old female    ICD-10-CM    1. Perennial allergic rhinitis  J30.89    2. 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 Chronic anosmia  R43.0        Continue Budesonide Rinses Twice Daily  Continue Dupixent  Follow up in 2 months with Noemy Cruz NP  See me in 1 year  She looks great, need to follow closely for progression of polypoid degen  May require prednisone taper in future, d/w Ealine today    Budesonide nasal saline irrigation per instructions:  -Obtain Aamir Med Sinus rinse over the counter.    -Use warm distilled water and 2 packets of the salt solution that comes with the bottle, dissolve in bottle up to the 240 mL desiree.  -Add 1 vial of budesonide.  -Irrigate each side of your nose leaning over the sink, using 1/3 to 1/2 the volume of the bottle in each nostril every irrigation.  Irrigate 2 times daily.  -If additional rinses are needed/recommended, you may use the plan Aamir Med Sinus irrigation without the use of added budesonide.       Continue anosmia precautions    Complete SNOT and scan to media at 2 month follow-up     Afua Cowan D.O.  Otolaryngology/Head and Neck Surgery  Allergy                      Again, thank you for allowing me to participate in the care of your patient.        Sincerely,        Afua Cowan MD

## 2021-06-30 LAB
FUNGUS SPEC CULT: NORMAL
Lab: NORMAL
SPECIMEN SOURCE: NORMAL

## 2021-08-31 ENCOUNTER — OFFICE VISIT (OUTPATIENT)
Dept: OTOLARYNGOLOGY | Facility: OTHER | Age: 66
End: 2021-08-31
Attending: PHYSICIAN ASSISTANT
Payer: COMMERCIAL

## 2021-08-31 VITALS
OXYGEN SATURATION: 95 % | WEIGHT: 223 LBS | BODY MASS INDEX: 41.04 KG/M2 | HEIGHT: 62 IN | DIASTOLIC BLOOD PRESSURE: 66 MMHG | SYSTOLIC BLOOD PRESSURE: 138 MMHG | HEART RATE: 72 BPM | TEMPERATURE: 97.8 F

## 2021-08-31 DIAGNOSIS — Z98.890 S/P FESS (FUNCTIONAL ENDOSCOPIC SINUS SURGERY): ICD-10-CM

## 2021-08-31 DIAGNOSIS — J33.9 NASAL POLYP: Primary | ICD-10-CM

## 2021-08-31 DIAGNOSIS — J32.4 CHRONIC PANSINUSITIS: ICD-10-CM

## 2021-08-31 DIAGNOSIS — J30.89 PERENNIAL ALLERGIC RHINITIS: ICD-10-CM

## 2021-08-31 DIAGNOSIS — R43.0 ANOSMIA: ICD-10-CM

## 2021-08-31 PROCEDURE — 31231 NASAL ENDOSCOPY DX: CPT | Performed by: PHYSICIAN ASSISTANT

## 2021-08-31 PROCEDURE — 99213 OFFICE O/P EST LOW 20 MIN: CPT | Mod: 25 | Performed by: PHYSICIAN ASSISTANT

## 2021-08-31 RX ORDER — FLUTICASONE PROPIONATE 50 MCG
2 SPRAY, SUSPENSION (ML) NASAL DAILY
Qty: 16 G | Refills: 11 | Status: SHIPPED | OUTPATIENT
Start: 2021-08-31 | End: 2023-01-19

## 2021-08-31 RX ORDER — BUDESONIDE 0.5 MG/2ML
INHALANT ORAL
Qty: 60 ML | Refills: 11 | Status: SHIPPED | OUTPATIENT
Start: 2021-08-31 | End: 2021-10-19

## 2021-08-31 ASSESSMENT — PAIN SCALES - GENERAL: PAINLEVEL: NO PAIN (0)

## 2021-08-31 ASSESSMENT — MIFFLIN-ST. JEOR: SCORE: 1514.77

## 2021-08-31 NOTE — NURSING NOTE
"Chief Complaint   Patient presents with     Surgical Followup     Pt is s/p fess with polypectomy, maxillary antrostomy, and TR 6/1/21.       Initial /66 (BP Location: Left arm, Cuff Size: Adult Large)   Pulse 72   Temp 97.8  F (36.6  C) (Tympanic)   Ht 1.575 m (5' 2\")   Wt 101.2 kg (223 lb)   SpO2 95%   BMI 40.79 kg/m   Estimated body mass index is 40.79 kg/m  as calculated from the following:    Height as of this encounter: 1.575 m (5' 2\").    Weight as of this encounter: 101.2 kg (223 lb).  Medication Reconciliation: complete  So Bergman LPN    "

## 2021-08-31 NOTE — PATIENT INSTRUCTIONS
Continue with Budesonide rinses.   Rinse 2 times daily  Start flonase 2 sprays to each nostril daily.   Recheck in 3 months  If increase of nasal symptoms- contact nurse.       Thank you for allowing Megan Kimball PA-C and our ENT team to participate in your care.  If your medications are too expensive, please give the nurse a call.  We can possibly change this medication.  If you have a scheduling or an appointment question please contact our Health Unit Coordinator at [phone], Ext. [phone].    ALL nursing questions or concerns can be directed to your ENT nurse at: [phone] [facility]      Budesonide nasal saline irrigation per instructions:  -Obtain Aamir Med Sinus rinse over the counter.    -Use warm distilled water and 2 packets of the salt solution that comes with the bottle, dissolve in bottle up to the 240 mL desiree.  -Add 1 vial of budesonide.  -Irrigate each side of your nose leaning over the sink, using 1/3 to 1/2 the volume of the bottle in each nostril every irrigation.  Irrigate 2 times daily.  -If additional rinses are needed/recommended, you may use the plan Aamir Med Sinus irrigation without the use of added budesonide

## 2021-08-31 NOTE — LETTER
8/31/2021         RE: Elaine Lara  [address]  [facility] [address]        Dear Colleague,    Thank you for referring your patient, Elaine Lara, to the [facility]. Please see a copy of my visit note below.    Chief Complaint   Patient presents with     Surgical Followup     Pt is s/p fess with polypectomy, maxillary antrostomy, and TR 6/1/21.     Patient returns for recheck of her sinuses.   Was last seen by Dr. Cowan on 6/28/21 and was doing well. 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     Elaine is very happy with her current outcome from her surgery and treatment. Tolerating rinses and Dupixent without concerns.   She is on Dupixent, started 5/27/21  She is using budesonide bid, uzma med every day      SNOT score- 35  She feels great  No heavy bleeding or pain  She has had anosmia for over 20 years and continues to have anosmia     Operative- 6/1/21  Procedures:    1.  Bilateral extended maxillary antrostomy with polypectomy  2.  Bilateral revision total ethmoidectomy  3.  Bilateral revision sphenoidotomy  4.  Left frontal sinusotomy  5.  Bilateral submucous reduction inferior turbinates  6.  Modifier 22 for extra 1 hour operating time due to diffuse polyposis and prior surgery  Sinus procedures performed with [employer] navigation     Findings: Severe diffuse polyposis occlusive of the former ethmoid cavity and filling the maxillary sinuses  Hypoplastic left frontal sinus and aplastic right frontal sinus     Final pathology shows eosinophilia.      History reviewed. No pertinent past medical history.     Allergies   Allergen Reactions     Ibuprofen Anaphylaxis     Other reaction(s): throat swells, hives  Head swelled up and started to swell in her throat before she was taken to the ER       Sulfamethoxazole W/Trimethoprim Rash     Hydrocodone-Acetaminophen Hives     Acetaminophen Rash     lortab     Current Outpatient Medications   Medication  "    albuterol (PROAIR HFA) 108 (90 Base) MCG/ACT inhaler     budesonide (PULMICORT) 0.5 MG/2ML neb solution     citalopram (CELEXA) 20 MG tablet     Dupilumab 300 MG/2ML SOPN     EPINEPHrine (ANY BX GENERIC EQUIV) 0.3 MG/0.3ML injection 2-pack     fluticasone (FLONASE) 50 MCG/ACT nasal spray     fluticasone-salmeterol (ADVAIR HFA) 45-21 MCG/ACT inhaler     hydrochlorothiazide (HYDRODIURIL) 12.5 MG tablet     mometasone (ELOCON) 0.1 % external ointment     montelukast (SINGULAIR) 10 MG tablet     Turmeric (QC TUMERIC COMPLEX) 500 MG CAPS     No current facility-administered medications for this visit.      ROS: 10 point ROS neg other than the symptoms noted above in the HPI.  /66 (BP Location: Left arm, Cuff Size: Adult Large)   Pulse 72   Temp 97.8  F (36.6  C) (Tympanic)   Ht 1.575 m (5' 2\")   Wt 101.2 kg (223 lb)   SpO2 95%   BMI 40.79 kg/m      General - The patient is well nourished and well developed, and appears to have good nutritional status.  Alert and oriented to person and place, interactive.  Head and Face - Normocephalic and atraumatic, with no gross asymmetry noted of the contour of the facial features.  The facial nerve is intact, with strong symmetric movements.  Grade 1/6 bilaterally  Neck-no palpable lymphadenopathy or thyroid mass.  Trachea is midline.  Eyes - Extraocular movements intact.   Ears- External auditory canals are patent, tympanic membranes are intact without effusion or worrisome retractions   Nose - Nasal mucosa is pink and moist with no abnormal mucus.  The septum was grossly midline and non-obstructive, turbinates of normal size and position.  No polyps, masses, or purulence noted on examination.  Mouth - Examination of the oral cavity shows pink, healthy, moist mucosa.  No lesions or ulceration noted.  The dentition are in good repair.  The tongue is mobile and midline.  Throat - The walls of the oropharynx were smooth, pink, moist, symmetric, and had no lesions or " ulcerations.  The tonsillar pillars and soft palate were symmetric.  The uvula was midline on elevation.       To evaluate the nose and sinuses in the post operative state, I performed rigid nasal endoscopy. The nose was anesthetized with home afrin or topical lidocaine and neosynephrine in the office.     I began with the LEFT side using a 0 degree rigid nasal endoscope, and then similarly examined the RIGHT side     Findings:  Inferior turbinates:  Lateralized  Middle turbinate and middle meatus:  No purulence, no polyposis, no synechiae  Antrostomy patent  Ethmoid cavity patent  Mucosa has mild polypoid degeneration throughout  She tolerated this well       ASSESSMENT:    ICD-10-CM    1. Nasal polyp  J33.9 fluticasone (FLONASE) 50 MCG/ACT nasal spray   2. 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 Perennial allergic rhinitis  J30.89    4. Chronic anosmia  R43.0    5. S/P FESS (functional endoscopic sinus surgery)  Z98.890          Continue Budesonide Rinses Twice Daily  Continue Dupixent  Follow up in 3 months for recheck.   Dr. Cowan June 2022.   She looks great, need to follow closely for progression of polypoid degen  May require prednisone taper in future, d/w Elaine today     Budesonide nasal saline irrigation per instructions:  -Obtain Aamir Med Sinus rinse over the counter.    -Use warm distilled water and 2 packets of the salt solution that comes with the bottle, dissolve in bottle up to the 240 mL desiree.  -Add 1 vial of budesonide.  -Irrigate each side of your nose leaning over the sink, using 1/3 to 1/2 the volume of the bottle in each nostril every irrigation.  Irrigate 2 times daily.  -If additional rinses are needed/recommended, you may use the plan Aamir Med Sinus irrigation without the use of added budesonide.         Continue anosmia  precautions. She has working smoke alarms.      SNOT score- 35        Megan Kimball PA-C  ENT  [facility], [place]             Again, thank you for allowing me to participate in the care of your patient.        Sincerely,        Megan Kimball PA-C

## 2021-08-31 NOTE — PROGRESS NOTES
Chief Complaint   Patient presents with     Surgical Followup     Pt is s/p fess with polypectomy, maxillary antrostomy, and TR 6/1/21.     Patient returns for recheck of her sinuses.   Was last seen by Dr. Cowan on 6/28/21 and was doing well. 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     Elaine is very happy with her current outcome from her surgery and treatment. Tolerating rinses and Dupixent without concerns.   She is on Dupixent, started 5/27/21  She is using budesonide bid, uzma med every day      SNOT score- 35  She feels great  No heavy bleeding or pain  She has had anosmia for over 20 years and continues to have anosmia     Operative- 6/1/21  Procedures:    1.  Bilateral extended maxillary antrostomy with polypectomy  2.  Bilateral revision total ethmoidectomy  3.  Bilateral revision sphenoidotomy  4.  Left frontal sinusotomy  5.  Bilateral submucous reduction inferior turbinates  6.  Modifier 22 for extra 1 hour operating time due to diffuse polyposis and prior surgery  Sinus procedures performed with [employer] navigation     Findings: Severe diffuse polyposis occlusive of the former ethmoid cavity and filling the maxillary sinuses  Hypoplastic left frontal sinus and aplastic right frontal sinus     Final pathology shows eosinophilia.      History reviewed. No pertinent past medical history.     Allergies   Allergen Reactions     Ibuprofen Anaphylaxis     Other reaction(s): throat swells, hives  Head swelled up and started to swell in her throat before she was taken to the ER       Sulfamethoxazole W/Trimethoprim Rash     Hydrocodone-Acetaminophen Hives     Acetaminophen Rash     lortab     Current Outpatient Medications   Medication     albuterol (PROAIR HFA) 108 (90 Base) MCG/ACT inhaler     budesonide (PULMICORT) 0.5 MG/2ML neb solution     citalopram (CELEXA) 20 MG tablet     Dupilumab 300 MG/2ML SOPN     EPINEPHrine (ANY BX GENERIC EQUIV) 0.3 MG/0.3ML injection  "2-pack     fluticasone (FLONASE) 50 MCG/ACT nasal spray     fluticasone-salmeterol (ADVAIR HFA) 45-21 MCG/ACT inhaler     hydrochlorothiazide (HYDRODIURIL) 12.5 MG tablet     mometasone (ELOCON) 0.1 % external ointment     montelukast (SINGULAIR) 10 MG tablet     Turmeric (QC TUMERIC COMPLEX) 500 MG CAPS     No current facility-administered medications for this visit.      ROS: 10 point ROS neg other than the symptoms noted above in the HPI.  /66 (BP Location: Left arm, Cuff Size: Adult Large)   Pulse 72   Temp 97.8  F (36.6  C) (Tympanic)   Ht 1.575 m (5' 2\")   Wt 101.2 kg (223 lb)   SpO2 95%   BMI 40.79 kg/m      General - The patient is well nourished and well developed, and appears to have good nutritional status.  Alert and oriented to person and place, interactive.  Head and Face - Normocephalic and atraumatic, with no gross asymmetry noted of the contour of the facial features.  The facial nerve is intact, with strong symmetric movements.  Grade 1/6 bilaterally  Neck-no palpable lymphadenopathy or thyroid mass.  Trachea is midline.  Eyes - Extraocular movements intact.   Ears- External auditory canals are patent, tympanic membranes are intact without effusion or worrisome retractions   Nose - Nasal mucosa is pink and moist with no abnormal mucus.  The septum was grossly midline and non-obstructive, turbinates of normal size and position.  No polyps, masses, or purulence noted on examination.  Mouth - Examination of the oral cavity shows pink, healthy, moist mucosa.  No lesions or ulceration noted.  The dentition are in good repair.  The tongue is mobile and midline.  Throat - The walls of the oropharynx were smooth, pink, moist, symmetric, and had no lesions or ulcerations.  The tonsillar pillars and soft palate were symmetric.  The uvula was midline on elevation.       To evaluate the nose and sinuses in the post operative state, I performed rigid nasal endoscopy. The nose was anesthetized with " home afrin or topical lidocaine and neosynephrine in the office.     I began with the LEFT side using a 0 degree rigid nasal endoscope, and then similarly examined the RIGHT side     Findings:  Inferior turbinates:  Lateralized  Middle turbinate and middle meatus:  No purulence, no polyposis, no synechiae  Antrostomy patent  Ethmoid cavity patent  Mucosa has mild polypoid degeneration throughout  She tolerated this well       ASSESSMENT:    ICD-10-CM    1. Nasal polyp  J33.9 fluticasone (FLONASE) 50 MCG/ACT nasal spray   2. 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 Perennial allergic rhinitis  J30.89    4. Chronic anosmia  R43.0    5. S/P FESS (functional endoscopic sinus surgery)  Z98.890          Continue Budesonide Rinses Twice Daily  Continue Dupixent  Follow up in 3 months for recheck.   Dr. Cowan June 2022.   She looks great, need to follow closely for progression of polypoid degen  May require prednisone taper in future, d/w Elaine today     Budesonide nasal saline irrigation per instructions:  -Obtain Aamir Med Sinus rinse over the counter.    -Use warm distilled water and 2 packets of the salt solution that comes with the bottle, dissolve in bottle up to the 240 mL desiree.  -Add 1 vial of budesonide.  -Irrigate each side of your nose leaning over the sink, using 1/3 to 1/2 the volume of the bottle in each nostril every irrigation.  Irrigate 2 times daily.  -If additional rinses are needed/recommended, you may use the plan Aamir Med Sinus irrigation without the use of added budesonide.         Continue anosmia precautions. She has working smoke alarms.      SNOT score- 35        Megan Kimball PA-C  ENT  [facility], [place]

## 2021-10-15 DIAGNOSIS — J32.4 CHRONIC PANSINUSITIS: ICD-10-CM

## 2021-10-19 RX ORDER — BUDESONIDE 0.5 MG/2ML
INHALANT ORAL
Qty: 60 ML | Refills: 11 | Status: SHIPPED | OUTPATIENT
Start: 2021-10-19 | End: 2023-01-19

## 2021-11-01 DIAGNOSIS — J33.9 NASAL POLYP: ICD-10-CM

## 2021-11-01 DIAGNOSIS — J32.9 CHRONIC SINUSITIS, UNSPECIFIED LOCATION: ICD-10-CM

## 2021-11-01 DIAGNOSIS — R43.0 ANOSMIA: ICD-10-CM

## 2021-11-01 NOTE — TELEPHONE ENCOUNTER
Dupixent   Last Written Prescription Date:  04/20/21  Last Fill Quantity: 4ml,   # refills: 5  Last Office Visit: 08/31/21  Future Office visit:    Next 5 appointments (look out 90 days)    Dec 02, 2021  2:00 PM  (Arrive by 1:45 PM)  Return Visit with Megan Kimball PA-C  [facility] [place] ([facility] [place] ) [address]  [place] [address]  [phone]

## 2021-11-02 RX ORDER — DUPILUMAB 300 MG/2ML
INJECTION, SOLUTION SUBCUTANEOUS
Qty: 4 ML | Refills: 5 | Status: SHIPPED | OUTPATIENT
Start: 2021-11-02 | End: 2021-11-04

## 2021-11-04 DIAGNOSIS — R43.0 ANOSMIA: ICD-10-CM

## 2021-11-04 DIAGNOSIS — J32.9 CHRONIC SINUSITIS, UNSPECIFIED LOCATION: Primary | ICD-10-CM

## 2021-11-04 DIAGNOSIS — J33.9 NASAL POLYP: ICD-10-CM

## 2021-11-04 DIAGNOSIS — Z98.890 S/P FESS (FUNCTIONAL ENDOSCOPIC SINUS SURGERY): ICD-10-CM

## 2021-11-04 DIAGNOSIS — J32.4 CHRONIC PANSINUSITIS: ICD-10-CM

## 2021-11-04 RX ORDER — DUPILUMAB 300 MG/2ML
300 INJECTION, SOLUTION SUBCUTANEOUS
Qty: 4 ML | Refills: 11 | Status: SHIPPED | OUTPATIENT
Start: 2021-11-04 | End: 2022-09-07

## 2021-11-04 NOTE — TELEPHONE ENCOUNTER
DUPIXENT 300 MG/2ML [facility]     Pharmacy called and said that they need this to be refilled. Please fax prescription to [phone]

## 2021-11-08 ENCOUNTER — TELEPHONE (OUTPATIENT)
Dept: OTOLARYNGOLOGY | Facility: OTHER | Age: 66
End: 2021-11-08
Payer: COMMERCIAL

## 2021-11-08 NOTE — TELEPHONE ENCOUNTER
Pt called and wanted to make sure that we sent the prescription of Dupixent 300MG/2ML SOPN.  Looks like 11/04 TR refilled it.

## 2021-12-01 ENCOUNTER — TELEPHONE (OUTPATIENT)
Dept: OTOLARYNGOLOGY | Facility: OTHER | Age: 66
End: 2021-12-01
Payer: COMMERCIAL

## 2021-12-01 NOTE — TELEPHONE ENCOUNTER
PA Initiation - Requested urgency from insurance    Medication: Dupixent  Insurance Company:    Pharmacy Filling the Rx: [facility] - [place], [place] - [facility] [address]  Filling Pharmacy Phone: [phone]  Filling Pharmacy Fax: [phone]  Start Date: 12/1/2021

## 2021-12-02 ENCOUNTER — OFFICE VISIT (OUTPATIENT)
Dept: OTOLARYNGOLOGY | Facility: OTHER | Age: 66
End: 2021-12-02
Attending: PHYSICIAN ASSISTANT
Payer: COMMERCIAL

## 2021-12-02 VITALS
HEART RATE: 80 BPM | TEMPERATURE: 99.6 F | HEIGHT: 62 IN | DIASTOLIC BLOOD PRESSURE: 70 MMHG | WEIGHT: 224 LBS | OXYGEN SATURATION: 98 % | SYSTOLIC BLOOD PRESSURE: 138 MMHG | BODY MASS INDEX: 41.22 KG/M2

## 2021-12-02 DIAGNOSIS — Z98.890 S/P FESS (FUNCTIONAL ENDOSCOPIC SINUS SURGERY): ICD-10-CM

## 2021-12-02 DIAGNOSIS — L98.9 SKIN SORE: Primary | ICD-10-CM

## 2021-12-02 DIAGNOSIS — R43.0 ANOSMIA: ICD-10-CM

## 2021-12-02 DIAGNOSIS — J32.4 CHRONIC PANSINUSITIS: ICD-10-CM

## 2021-12-02 PROCEDURE — 99213 OFFICE O/P EST LOW 20 MIN: CPT | Mod: 25 | Performed by: PHYSICIAN ASSISTANT

## 2021-12-02 PROCEDURE — 31231 NASAL ENDOSCOPY DX: CPT | Performed by: PHYSICIAN ASSISTANT

## 2021-12-02 RX ORDER — MUPIROCIN 20 MG/G
OINTMENT TOPICAL
Qty: 22 G | Refills: 0 | Status: SHIPPED | OUTPATIENT
Start: 2021-12-02 | End: 2023-04-20

## 2021-12-02 ASSESSMENT — MIFFLIN-ST. JEOR: SCORE: 1519.31

## 2021-12-02 ASSESSMENT — PAIN SCALES - GENERAL: PAINLEVEL: NO PAIN (0)

## 2021-12-02 NOTE — LETTER
12/2/2021         RE: Elaine Lara  [address]  [facility] [address]        Dear Colleague,    Thank you for referring your patient, Elaine Lara, to the [facility] [place]. Please see a copy of my visit note below.    Chief Complaint   Patient presents with     RECHECK     Pt is here for a sinus check.  S/p fess and TR 6/1/21.         Patient returns for recheck of her sinuses.   Was last seen on 8/31/21 and was doing well. 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      Elaine is very happy with her current outcome from her surgery and treatment. Tolerating rinses and Dupixent without concerns.   She is on Dupixent, started 5/27/21  She is using budesonide bid      SNOT score- 35  She feels great  No heavy bleeding or pain  She has had anosmia for over 20 years and continues to have anosmia     Operative- 6/1/21  Procedures:    1.  Bilateral extended maxillary antrostomy with polypectomy  2.  Bilateral revision total ethmoidectomy  3.  Bilateral revision sphenoidotomy  4.  Left frontal sinusotomy  5.  Bilateral submucous reduction inferior turbinates  6.  Modifier 22 for extra 1 hour operating time due to diffuse polyposis and prior surgery  Sinus procedures performed with [employer] navigation     Findings: Severe diffuse polyposis occlusive of the former ethmoid cavity and filling the maxillary sinuses  Hypoplastic left frontal sinus and aplastic right frontal sinus     Final pathology shows eosinophilia.    No past medical history on file.     Allergies   Allergen Reactions     Ibuprofen Anaphylaxis     Other reaction(s): throat swells, hives  Head swelled up and started to swell in her throat before she was taken to the ER       Sulfamethoxazole W/Trimethoprim Rash     Hydrocodone-Acetaminophen Hives     Acetaminophen Rash     lortab     Current Outpatient Medications   Medication     albuterol (PROAIR HFA) 108 (90 Base) MCG/ACT inhaler     budesonide  "(PULMICORT) 0.5 MG/2ML neb solution     citalopram (CELEXA) 20 MG tablet     Dupilumab (DUPIXENT) 300 MG/2ML SOPN     EPINEPHrine (ANY BX GENERIC EQUIV) 0.3 MG/0.3ML injection 2-pack     fluticasone (FLONASE) 50 MCG/ACT nasal spray     fluticasone-salmeterol (ADVAIR HFA) 45-21 MCG/ACT inhaler     hydrochlorothiazide (HYDRODIURIL) 12.5 MG tablet     mometasone (ELOCON) 0.1 % external ointment     montelukast (SINGULAIR) 10 MG tablet     Turmeric (QC TUMERIC COMPLEX) 500 MG CAPS     No current facility-administered medications for this visit.     ROS- SEE HPI  /70 (BP Location: Left arm, Cuff Size: Adult Regular)   Pulse 80   Temp 99.6  F (37.6  C) (Tympanic)   Ht 1.575 m (5' 2\")   Wt 101.6 kg (224 lb)   SpO2 98%   BMI 40.97 kg/m      General - The patient is well nourished and well developed, and appears to have good nutritional status.  Alert and oriented to person and place, interactive.  Head and Face - Normocephalic and atraumatic, with no gross asymmetry noted of the contour of the facial features.  The facial nerve is intact, with strong symmetric movements.  Grade 1/6 bilaterally  Neck-no palpable lymphadenopathy or thyroid mass.  Trachea is midline.  Eyes - Extraocular movements intact.   Ears- External auditory canals are patent, tympanic membranes are intact without effusion or worrisome retractions   Each outer ear has  A small 5 mm open sore, scabbing.   Nose - Nasal mucosa is pink and moist with no abnormal mucus.  The septum was grossly midline and non-obstructive, turbinates of normal size and position.  No polyps, masses, or purulence noted on examination.  Mouth - Examination of the oral cavity shows pink, healthy, moist mucosa.  No lesions or ulceration noted.  The dentition are in good repair.  The tongue is mobile and midline.  Throat - The walls of the oropharynx were smooth, pink, moist, symmetric, and had no lesions or ulcerations.  The tonsillar pillars and soft palate were " symmetric.  The uvula was midline on elevation.    Heart-  Regular rate  Lungs- Clear A/p.    To evaluate the nose and sinuses in the post operative state, I performed rigid nasal endoscopy. The nose was anesthetized with home afrin or topical lidocaine and neosynephrine in the office.     I began with the LEFT side using a 0 degree rigid nasal endoscope, and then similarly examined the RIGHT side     Findings:  Inferior turbinates:  Lateralized  Middle turbinate and middle meatus:  No purulence, no polyposis, no synechiae  Antrostomy patent  Ethmoid cavity patent  Mucosa has mild polypoid degeneration throughout  She tolerated this well         ASSESSMENT/ PLAN:    ICD-10-CM    1. Skin sore  L98.9 mupirocin (BACTROBAN) 2 % external ointment   2. 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 S/P FESS (functional endoscopic sinus surgery)  Z98.890    4. Anosmia  R43.0        Start Nasal Ayr gel to both nostrils at night.   Continue with budesonide rinses 2 times daily  Use Aamir med rinse daily or as needed.   Continue with Flonase 2 sprays to each nostril.   Continue with Dupixent.     Use bactroban to outer ear sores for 7 days as directed.   Avoid manipulation.   Follow up with Dr. Cowan in 5-6 months.     She looks great, need to follow closely for progression of polypoid degen  May require prednisone taper in future, d/w Elaine today       Megan Kimball PA-C  ENT  [facility], [place]                 Again, thank you for allowing me to participate in the care of your patient.        Sincerely,        Megan Kimball PA-C

## 2021-12-02 NOTE — PROGRESS NOTES
Chief Complaint   Patient presents with     RECHECK     Pt is here for a sinus check.  S/p fess and TR 6/1/21.         Patient returns for recheck of her sinuses.   Was last seen on 8/31/21 and was doing well. 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      Elaine is very happy with her current outcome from her surgery and treatment. Tolerating rinses and Dupixent without concerns.   She is on Dupixent, started 5/27/21  She is using budesonide bid      SNOT score- 35  She feels great  No heavy bleeding or pain  She has had anosmia for over 20 years and continues to have anosmia     Operative- 6/1/21  Procedures:    1.  Bilateral extended maxillary antrostomy with polypectomy  2.  Bilateral revision total ethmoidectomy  3.  Bilateral revision sphenoidotomy  4.  Left frontal sinusotomy  5.  Bilateral submucous reduction inferior turbinates  6.  Modifier 22 for extra 1 hour operating time due to diffuse polyposis and prior surgery  Sinus procedures performed with [employer] navigation     Findings: Severe diffuse polyposis occlusive of the former ethmoid cavity and filling the maxillary sinuses  Hypoplastic left frontal sinus and aplastic right frontal sinus     Final pathology shows eosinophilia.    No past medical history on file.     Allergies   Allergen Reactions     Ibuprofen Anaphylaxis     Other reaction(s): throat swells, hives  Head swelled up and started to swell in her throat before she was taken to the ER       Sulfamethoxazole W/Trimethoprim Rash     Hydrocodone-Acetaminophen Hives     Acetaminophen Rash     lortab     Current Outpatient Medications   Medication     albuterol (PROAIR HFA) 108 (90 Base) MCG/ACT inhaler     budesonide (PULMICORT) 0.5 MG/2ML neb solution     citalopram (CELEXA) 20 MG tablet     Dupilumab (DUPIXENT) 300 MG/2ML SOPN     EPINEPHrine (ANY BX GENERIC EQUIV) 0.3 MG/0.3ML injection 2-pack     fluticasone (FLONASE) 50 MCG/ACT nasal spray      "fluticasone-salmeterol (ADVAIR HFA) 45-21 MCG/ACT inhaler     hydrochlorothiazide (HYDRODIURIL) 12.5 MG tablet     mometasone (ELOCON) 0.1 % external ointment     montelukast (SINGULAIR) 10 MG tablet     Turmeric (QC TUMERIC COMPLEX) 500 MG CAPS     No current facility-administered medications for this visit.     ROS- SEE HPI  /70 (BP Location: Left arm, Cuff Size: Adult Regular)   Pulse 80   Temp 99.6  F (37.6  C) (Tympanic)   Ht 1.575 m (5' 2\")   Wt 101.6 kg (224 lb)   SpO2 98%   BMI 40.97 kg/m      General - The patient is well nourished and well developed, and appears to have good nutritional status.  Alert and oriented to person and place, interactive.  Head and Face - Normocephalic and atraumatic, with no gross asymmetry noted of the contour of the facial features.  The facial nerve is intact, with strong symmetric movements.  Grade 1/6 bilaterally  Neck-no palpable lymphadenopathy or thyroid mass.  Trachea is midline.  Eyes - Extraocular movements intact.   Ears- External auditory canals are patent, tympanic membranes are intact without effusion or worrisome retractions   Each outer ear has  A small 5 mm open sore, scabbing.   Nose - Nasal mucosa is pink and moist with no abnormal mucus.  The septum was grossly midline and non-obstructive, turbinates of normal size and position.  No polyps, masses, or purulence noted on examination.  Mouth - Examination of the oral cavity shows pink, healthy, moist mucosa.  No lesions or ulceration noted.  The dentition are in good repair.  The tongue is mobile and midline.  Throat - The walls of the oropharynx were smooth, pink, moist, symmetric, and had no lesions or ulcerations.  The tonsillar pillars and soft palate were symmetric.  The uvula was midline on elevation.    Heart-  Regular rate  Lungs- Clear A/p.    To evaluate the nose and sinuses in the post operative state, I performed rigid nasal endoscopy. The nose was anesthetized with home afrin or topical " lidocaine and neosynephrine in the office.     I began with the LEFT side using a 0 degree rigid nasal endoscope, and then similarly examined the RIGHT side     Findings:  Inferior turbinates:  Lateralized  Middle turbinate and middle meatus:  No purulence, no polyposis, no synechiae  Antrostomy patent  Ethmoid cavity patent  Mucosa has mild polypoid degeneration throughout  She tolerated this well         ASSESSMENT/ PLAN:    ICD-10-CM    1. Skin sore  L98.9 mupirocin (BACTROBAN) 2 % external ointment   2. 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 S/P FESS (functional endoscopic sinus surgery)  Z98.890    4. Anosmia  R43.0        Start Nasal Ayr gel to both nostrils at night.   Continue with budesonide rinses 2 times daily  Use Aamir med rinse daily or as needed.   Continue with Flonase 2 sprays to each nostril.   Continue with Dupixent.     Use bactroban to outer ear sores for 7 days as directed.   Avoid manipulation.   Follow up with Dr. Cowan in 5-6 months.     She looks great, need to follow closely for progression of polypoid degen  May require prednisone taper in future, d/w Elaine Kimball PA-C  ENT  [facility], [place]

## 2021-12-02 NOTE — PATIENT INSTRUCTIONS
Start Nasal Ayr gel to both nostrils at night.   Continue with budesonide rinses 2 times daily  Use Aamir med rinse daily or as needed.   Continue with Flonase 2 sprays to each nostril.     Use bactroban to outer ear sores for 7 days as directed.   Avoid manipulation.   Follow up with Dr. Cowan in 5-6 months.       Thank you for allowing Megan Kimball PA-C and our ENT team to participate in your care.  If your medications are too expensive, please give the nurse a call.  We can possibly change this medication.  If you have a scheduling or an appointment question please contact our Health Unit Coordinator at [phone], Ext. [phone].    ALL nursing questions or concerns can be directed to your ENT nurse at: [phone] So

## 2021-12-02 NOTE — NURSING NOTE
"Chief Complaint   Patient presents with     RECHECK     Pt is here for a sinus check.  S/p fess and TR 6/1/21.       Initial /70 (BP Location: Left arm, Cuff Size: Adult Regular)   Pulse 80   Temp 99.6  F (37.6  C) (Tympanic)   Ht 1.575 m (5' 2\")   Wt 101.6 kg (224 lb)   SpO2 98%   BMI 40.97 kg/m   Estimated body mass index is 40.97 kg/m  as calculated from the following:    Height as of this encounter: 1.575 m (5' 2\").    Weight as of this encounter: 101.6 kg (224 lb).  Medication Reconciliation: complete  So Bergman LPN    "

## 2021-12-03 NOTE — TELEPHONE ENCOUNTER
Prior Authorization Approval    Authorization Effective Date: 12/1/2021  Authorization Expiration Date: 12/1/2022  Medication: Dupixent   Which Pharmacy is filling the prescription (Not needed for infusion/clinic administered): [facility] - [place], [place] - [address]  Pharmacy Notified: Yes  Patient Notified: Yes

## 2022-02-14 ENCOUNTER — TELEPHONE (OUTPATIENT)
Dept: OTOLARYNGOLOGY | Facility: OTHER | Age: 67
End: 2022-02-14
Payer: COMMERCIAL

## 2022-02-14 NOTE — TELEPHONE ENCOUNTER
Patient calling in regards to dupixent. States her insurance changed on the first of the year and that her pharmacy is now [facility] specialty pharmacy. Patient states this medication is over $200. Has a PA been sent for this medication for new pharmacy. I see patient had PA approved from 12/01/21-12/01/22 for previous insurance. Can you look into this at your earliest convenience. Thank you.

## 2022-02-15 NOTE — TELEPHONE ENCOUNTER
This writer called patient and her insurance has changed. patient states she has [employer] insurance.     ID# [id number]  Group # [id number]  Provider service # [phone]  Customer service # [phone]

## 2022-02-17 PROBLEM — J33.9 ASPIRIN-EXACERBATED RESPIRATORY DISEASE (AERD): Status: ACTIVE | Noted: 2021-04-19

## 2022-02-17 PROBLEM — J45.909 ASPIRIN-EXACERBATED RESPIRATORY DISEASE (AERD): Status: ACTIVE | Noted: 2021-04-19

## 2022-02-17 PROBLEM — Z88.6 ASPIRIN-EXACERBATED RESPIRATORY DISEASE (AERD): Status: ACTIVE | Noted: 2021-04-19

## 2022-02-17 NOTE — TELEPHONE ENCOUNTER
Shawn Napier 1 hour ago (12:10 PM)     DS    Submitted PA form to insurance for Dupixent. Received NO PA NEEDED for medication. 02/17/2022. Forms scanned to Epic.     Message text

## 2022-02-17 NOTE — TELEPHONE ENCOUNTER
Patient notified no PA needed for the dupixent. Patient states this medication costs her $250 for two injections and $500 a month. Is there any help for this medication or an alternative for patient. Please advise, thank you.

## 2022-02-22 NOTE — TELEPHONE ENCOUNTER
Called patient and told her I would mail the Dupixent [employer] paperwork to fill out.  She will then mail it back to the clinic once she fills out her section and I will fax it to [employer].    Carlos Simmons RN on 2/22/2022 at 2:51 PM

## 2022-04-01 ENCOUNTER — TELEPHONE (OUTPATIENT)
Dept: ALLERGY | Facility: OTHER | Age: 67
End: 2022-04-01
Payer: COMMERCIAL

## 2022-04-01 NOTE — TELEPHONE ENCOUNTER
Patient's Dupixent MyWay enrollment form was faxed to [employer] on 3/25/22.  Called patient and she informed writer that she already received her Dupixent Co-pay card.  Her previous card , so a new one was done so she could continue to get her medication through the [email or web address] program.    Carlos Simmons RN on 2022 at 2:00 PM

## 2022-04-13 NOTE — TELEPHONE ENCOUNTER
Shawn Napier  You 49 minutes ago (1:27 PM)     DS    I have not seen a PA for this patient for Dupixent. Did her insurance change from the [employer] insurance that is on file? I can submit a PA for patient. Just want to make sure I have the right insurance information.          English

## 2022-06-01 NOTE — PROGRESS NOTES
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      Procedures 6/1/21    1.  Bilateral extended maxillary antrostomy with polypectomy  2.  Bilateral revision total ethmoidectomy  3.  Bilateral revision sphenoidotomy  4.  Left frontal sinusotomy  5.  Bilateral submucous reduction inferior turbinates  6.  Modifier 22 for extra 1 hour operating time due to diffuse polyposis and prior surgery    Findings: Severe diffuse polyposis occlusive of the former ethmoid cavity and filling the maxillary sinuses  Hypoplastic left frontal sinus and aplastic right frontal sinus    > 100 eos on path      Using budesonide irrigations and started on Dupixent 5/27/2021    SNOT 40 with a moderate problem with need to blow nose sneezing and rhinorrhea  She has been doing very well and has had no congestion, rhinorrhea or anosmia until about 3 weeks ago after she went camping    No NSAID use    12 point review systems is negative aside from that mentioned in the history of present illness and heartburn, numbness to the right hand      Physical Exam  /78   Pulse 80   Temp 97.2  F (36.2  C) (Tympanic)   Ht 1.575 m (5' 2\")   Wt 101.6 kg (224 lb)   SpO2 94%   BMI 40.97 kg/m      General - The patient is well nourished and well developed, and appears to have good nutritional status.  Alert and oriented to person and place, interactive.  Head and Face - Normocephalic and atraumatic, with no gross asymmetry noted of the contour of the facial features.  The facial nerve is intact, with strong symmetric movements.  Eyes - Extraocular movements intact.   Nose - Nasal mucosa is pink and moist with no abnormal mucus.  The septum was grossly midline, turbinates are without excess edema.  No polyps, masses, or purulence noted on examination.      To evaluate the nose and sinuses in the post operative state, I " performed rigid nasal endoscopy. The nose was anesthetized with home afrin or topical lidocaine and neosynephrine in the office.    I began with the LEFT side using a 0 degree rigid nasal endoscope, and then similarly examined the RIGHT side    Findings:  Inferior turbinates:  Lateralized  I used an 8 cabezas and curved suction to remove purulent secretions from the ethmoid cavity and maxillary antrostomies   There is severe polypoid degeneration throughout the maxillary and ethmoid cavity with obstructive follow-up polyps in the ethmoid cavity.  I cannot visualize the frontal recess  Purulent secretions suction abraded from the sphenoethmoidal recess the nasopharynx is clear      Impression/Plan      ICD-10-CM    1. Nasal polyp  J33.9    2. Disorder of respiratory system exacerbated by aspirin  J45.909     J33.9     Z88.6    3. Chronic pansinusitis  J32.4    4. History of endoscopic sinus surgery  Z98.890    5. Perennial allergic rhinitis  J30.89        Continue budesonide irrigations as indicated:  Bid and uzma med at least daily    Complete prednisone taper and Augmentin    Risks of oral steroid use were discussed and include psychiatric/mood changes, insomnia, stomach ulcers and potential GI bleeding, blood sugar elevation/worsening diabetes, hip/bone necrosis called avascular necrosis, or bone demineralization.      Follow-up for reevaluation in 2-4 weeks, complete SNOT at follow-up

## 2022-06-02 ENCOUNTER — OFFICE VISIT (OUTPATIENT)
Dept: OTOLARYNGOLOGY | Facility: OTHER | Age: 67
End: 2022-06-02
Attending: OTOLARYNGOLOGY
Payer: COMMERCIAL

## 2022-06-02 VITALS
SYSTOLIC BLOOD PRESSURE: 132 MMHG | TEMPERATURE: 97.2 F | HEART RATE: 80 BPM | BODY MASS INDEX: 41.22 KG/M2 | OXYGEN SATURATION: 94 % | WEIGHT: 224 LBS | HEIGHT: 62 IN | DIASTOLIC BLOOD PRESSURE: 78 MMHG

## 2022-06-02 DIAGNOSIS — J32.4 CHRONIC PANSINUSITIS: ICD-10-CM

## 2022-06-02 DIAGNOSIS — J30.89 PERENNIAL ALLERGIC RHINITIS: ICD-10-CM

## 2022-06-02 DIAGNOSIS — Z88.6 ASPIRIN-EXACERBATED RESPIRATORY DISEASE (AERD): ICD-10-CM

## 2022-06-02 DIAGNOSIS — J33.9 NASAL POLYP: Primary | ICD-10-CM

## 2022-06-02 DIAGNOSIS — J33.9 ASPIRIN-EXACERBATED RESPIRATORY DISEASE (AERD): ICD-10-CM

## 2022-06-02 DIAGNOSIS — J45.909 ASPIRIN-EXACERBATED RESPIRATORY DISEASE (AERD): ICD-10-CM

## 2022-06-02 DIAGNOSIS — Z98.890 HISTORY OF ENDOSCOPIC SINUS SURGERY: ICD-10-CM

## 2022-06-02 PROCEDURE — 31231 NASAL ENDOSCOPY DX: CPT | Performed by: OTOLARYNGOLOGY

## 2022-06-02 PROCEDURE — 99213 OFFICE O/P EST LOW 20 MIN: CPT | Mod: 25 | Performed by: OTOLARYNGOLOGY

## 2022-06-02 RX ORDER — PREDNISONE 10 MG/1
TABLET ORAL
Qty: 24 TABLET | Refills: 0 | Status: SHIPPED | OUTPATIENT
Start: 2022-06-02 | End: 2022-06-11

## 2022-06-02 RX ORDER — BUDESONIDE 0.5 MG/2ML
INHALANT ORAL
Qty: 200 ML | Refills: 11 | Status: SHIPPED | OUTPATIENT
Start: 2022-06-02

## 2022-06-02 ASSESSMENT — PAIN SCALES - GENERAL: PAINLEVEL: NO PAIN (0)

## 2022-06-02 NOTE — LETTER
"    6/2/2022         RE: Elaine Lara  [address]  [facility] [address]        Dear Colleague,    Thank you for referring your patient, Elaine Lara, to the [facility]. Please see a copy of my visit note below.    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      Procedures 6/1/21    1.  Bilateral extended maxillary antrostomy with polypectomy  2.  Bilateral revision total ethmoidectomy  3.  Bilateral revision sphenoidotomy  4.  Left frontal sinusotomy  5.  Bilateral submucous reduction inferior turbinates  6.  Modifier 22 for extra 1 hour operating time due to diffuse polyposis and prior surgery    Findings: Severe diffuse polyposis occlusive of the former ethmoid cavity and filling the maxillary sinuses  Hypoplastic left frontal sinus and aplastic right frontal sinus    > 100 eos on path      Using budesonide irrigations and started on Dupixent 5/27/2021    SNOT 42 with a moderate problem with need to blow nose sneezing and rhinorrhea  She has been doing very well and has had no congestion, rhinorrhea or anosmia until about 3 weeks ago after she went camping    No NSAID use    12 point review systems is negative aside from that mentioned in the history of present illness and heartburn, numbness to the right hand      Physical Exam  /78   Pulse 80   Temp 97.2  F (36.2  C) (Tympanic)   Ht 1.575 m (5' 2\")   Wt 101.6 kg (224 lb)   SpO2 94%   BMI 40.97 kg/m      General - The patient is well nourished and well developed, and appears to have good nutritional status.  Alert and oriented to person and place, interactive.  Head and Face - Normocephalic and atraumatic, with no gross asymmetry noted of the contour of the facial features.  The facial nerve is intact, with strong symmetric movements.  Eyes - Extraocular movements intact.   Nose - Nasal mucosa " is pink and moist with no abnormal mucus.  The septum was grossly midline, turbinates are without excess edema.  No polyps, masses, or purulence noted on examination.      To evaluate the nose and sinuses in the post operative state, I performed rigid nasal endoscopy. The nose was anesthetized with home afrin or topical lidocaine and neosynephrine in the office.    I began with the LEFT side using a 0 degree rigid nasal endoscope, and then similarly examined the RIGHT side    Findings:  Inferior turbinates:  Lateralized  I used an 8 cabezas and curved suction to remove purulent secretions from the ethmoid cavity and maxillary antrostomies   There is severe polypoid degeneration throughout the maxillary and ethmoid cavity with obstructive follow-up polyps in the ethmoid cavity.  I cannot visualize the frontal recess  Purulent secretions suction abraded from the sphenoethmoidal recess the nasopharynx is clear      Impression/Plan      ICD-10-CM    1. Nasal polyp  J33.9    2. Disorder of respiratory system exacerbated by aspirin  J45.909     J33.9     Z88.6    3. Chronic pansinusitis  J32.4    4. History of endoscopic sinus surgery  Z98.890    5. Perennial allergic rhinitis  J30.89        Continue budesonide irrigations as indicated:  Bid and uzma med at least daily    Complete prednisone taper and Augmentin    Risks of oral steroid use were discussed and include psychiatric/mood changes, insomnia, stomach ulcers and potential GI bleeding, blood sugar elevation/worsening diabetes, hip/bone necrosis called avascular necrosis, or bone demineralization.      Follow-up for reevaluation in 2-4 weeks, complete SNOT at follow-up           Again, thank you for allowing me to participate in the care of your patient.        Sincerely,        Afua Cowan MD

## 2022-06-02 NOTE — NURSING NOTE
"Chief Complaint   Patient presents with     RECHECK     Sinus Check; FESS, Turbinate Reduction on 6/1/21       Initial /78   Pulse 80   Temp 97.2  F (36.2  C) (Tympanic)   Ht 1.575 m (5' 2\")   Wt 101.6 kg (224 lb)   SpO2 94%   BMI 40.97 kg/m   Estimated body mass index is 40.97 kg/m  as calculated from the following:    Height as of this encounter: 1.575 m (5' 2\").    Weight as of this encounter: 101.6 kg (224 lb).  Medication Reconciliation: complete  Luci Guerra LPN    "

## 2022-06-02 NOTE — PATIENT INSTRUCTIONS
Thank you for allowing Dr. Cowan and our ENT team to participate in your care.  If your medications are too expensive, please give the nurse a call.  We can possibly change this medication.  If you have a scheduling or an appointment question please contact our Health Unit Coordinator at their direct line [phone].   ALL nursing questions or concerns can be directed to your ENT nurse at: [phone] - [place]    Continue Dupixent as you are doing    Start budesonide (PULMICORT) 0.5 MG/2ML neb solution  Budesonide instructions  Make the Aamir Med Saline Solution using 2 packages of salt and previously boiled or distilled water.  This will make 240 ml of saline solution.  Mix the entire vial of budesonide into the solution.   Irrigate your nose 2 times a day with the warm budesonide solution using 1 bottle between nostrils in the morning, and one bottle at night.     Rinse 1 time a day with plain saline    Start today: amoxicillin-clavulanate (AUGMENTIN) 875-125 MG tablet, Take 1 tablet by mouth 2 times daily for 14 days Take with probiotic.  Start today    Start tomorrow: predniSONE (DELTASONE) 10 MG tablet, Take 4 tablets (40 mg) by mouth daily for 3 days, THEN 3 tablets (30 mg) daily for 2 days, THEN 2 tablets (20 mg) daily for 2 days, THEN 1 tablet (10 mg) daily for 2 days    Follow up in 3-4 weeks with Megan Kimball or Donita Cruz to check sinus

## 2022-06-22 PROBLEM — I83.92 VARICOSE VEINS OF LEFT LOWER EXTREMITY: Status: ACTIVE | Noted: 2022-06-22

## 2022-06-29 ENCOUNTER — OFFICE VISIT (OUTPATIENT)
Dept: OTOLARYNGOLOGY | Facility: OTHER | Age: 67
End: 2022-06-29
Attending: NURSE PRACTITIONER
Payer: COMMERCIAL

## 2022-06-29 VITALS
SYSTOLIC BLOOD PRESSURE: 131 MMHG | HEART RATE: 78 BPM | DIASTOLIC BLOOD PRESSURE: 78 MMHG | OXYGEN SATURATION: 97 % | HEIGHT: 62 IN | WEIGHT: 224 LBS | BODY MASS INDEX: 41.22 KG/M2 | TEMPERATURE: 98.8 F

## 2022-06-29 DIAGNOSIS — Z98.890 HISTORY OF ENDOSCOPIC SINUS SURGERY: ICD-10-CM

## 2022-06-29 DIAGNOSIS — J33.9 NASAL POLYP: Primary | ICD-10-CM

## 2022-06-29 DIAGNOSIS — J32.4 CHRONIC PANSINUSITIS: ICD-10-CM

## 2022-06-29 PROCEDURE — 31231 NASAL ENDOSCOPY DX: CPT | Performed by: NURSE PRACTITIONER

## 2022-06-29 PROCEDURE — 99213 OFFICE O/P EST LOW 20 MIN: CPT | Mod: 25 | Performed by: NURSE PRACTITIONER

## 2022-06-29 ASSESSMENT — PAIN SCALES - GENERAL: PAINLEVEL: NO PAIN (0)

## 2022-06-29 NOTE — PATIENT INSTRUCTIONS
Thank you for allowing Donita Anthony and our ENT team to participate in your care.  If your medications are too expensive, please give the nurse a call.  We can possibly change this medication.  If you have a scheduling or an appointment question please contact our Health Unit Coordinator at their direct line [phone] ext [phone].   ALL nursing questions or concerns can be directed to your ENT nurse at: [phone] - [place]       Continue Budesonide instructions  Make the Uzma Med Saline Solution using 2 packages of salt and previously boiled or distilled water.  This will make 240 ml of saline solution.  Mix the entire vial of budesonide into the solution.   Irrigate your nose 2 times a day with the warm budesonide solution using 1 bottle between nostrils in the morning, and one bottle at night.     Start Uzma Med Nasal Saline  Use high volume uzma med saline irrigation.  Use warm distilled water and 2 packets of the salt solution that comes with the bottle, dissolve in bottle up to 240 ml desiree.  Irrigate each side of your nose leaning over the sink, using 1/3 to 1/2 the volume of the bottle in each nostril every irrigation.  Irrigate 1 time daily and as needed.    Start Flonase nasal spray.  Use 2 sprays in each nostril 2 times a day     Follow up with Dr. Cowan in 3-4 months for sinus/nasal polyps recheck     Make an opthalmology appointment for comprehensive eye exam

## 2022-06-29 NOTE — NURSING NOTE
"Chief Complaint   Patient presents with     RECHECK     2-3 week sinus check        Initial /78 (BP Location: Left arm, Patient Position: Sitting, Cuff Size: Adult Large)   Pulse 78   Temp 98.8  F (37.1  C) (Tympanic)   Ht 1.575 m (5' 2\")   Wt 101.6 kg (224 lb)   SpO2 97%   BMI 40.97 kg/m   Estimated body mass index is 40.97 kg/m  as calculated from the following:    Height as of this encounter: 1.575 m (5' 2\").    Weight as of this encounter: 101.6 kg (224 lb).  Medication Reconciliation: complete     Julienne Barone LPN    "

## 2022-06-29 NOTE — LETTER
6/29/2022         RE: Elaine Lara  [address] [facility] [address]        Dear Colleague,    Thank you for referring your patient, Elaine Lara, to the [facility]. Please see a copy of my visit note below.      Otolaryngology Note         Chief Complaint:     Patient presents with:  RECHECK: 2-3 week sinus check            History of Present Illness:     Elaine Lara is a 67 year old female who presents today for follow up.  She was last seen by Dr Cowan on 6/2/22 for post op FESS appointment.      On endoscopic evaluation Dr. Cowan noted purulent secretions in the ethmoid cavity and maxillary antrostomies with severe polypoid degeneration throughout the maxillary and ethmoid cavity and obstructive polyps in the ethmoid cavity.  She was unable to visualize the frontal recess.  The patient was treated with continued budesonide irrigations, oral prednisone taper and Augmentin.  She presents today for follow-up.    She started Dupixent in May of 2021. She has been tolerating well and has noted significant improvement in nasal congestion, rhinorrhea and asthma.  She denies any significant side effects.  She receives her medications from [facility] specialty pharmacy and has not had any difficulties with getting medication.      Today she reports she completed the steroid and abx.  She continues to rinse with budesonide.  She has been feeling good.  She denies fevers, chills, rhinorrhea, facial pain or pressure.  She is able to breath through her nose well without obstruction.      SNOT completed 6/2/2022 with Dr. Cowan was 40. SNOT score today is 18 with moderate problem of decreased sense of smell/taste and frustrated/restless/irritable.        Surgical Details:      Procedures 6/1/21     1.  Bilateral extended maxillary antrostomy with polypectomy  2.  Bilateral revision total ethmoidectomy  3.  Bilateral revision sphenoidotomy  4.  Left frontal sinusotomy  5.  Bilateral  submucous reduction inferior turbinates  6.  Modifier 22 for extra 1 hour operating time due to diffuse polyposis and prior surgery     Findings: Severe diffuse polyposis occlusive of the former ethmoid cavity and filling the maxillary sinuses  Hypoplastic left frontal sinus and aplastic right frontal sinus     > 100 eos on path         Medications:     Current Outpatient Rx   Medication Sig Dispense Refill     albuterol (PROAIR HFA/PROVENTIL HFA/VENTOLIN HFA) 108 (90 Base) MCG/ACT inhaler 1 puff as needed       budesonide (PULMICORT) 0.5 MG/2ML neb solution Squirt entire vial into uzma med saline solution, mix, and irrigate each nostril until entire bottle empty.  Do this twice daily. 200 mL 11     budesonide (PULMICORT) 0.5 MG/2ML neb solution Squirt entire vial into previously made uzma med saline bottle, mix, and irrigate each nostril until entire bottle empty.  Do this twice daily. 60 mL 11     citalopram (CELEXA) 20 MG tablet Take 20 mg by mouth daily       Dupilumab (DUPIXENT) 300 MG/2ML SOPN Inject 300 mg Subcutaneous every 14 days 4 mL 11     fluticasone (FLONASE) 50 MCG/ACT nasal spray Spray 2 sprays into both nostrils daily 16 g 11     fluticasone-salmeterol (ADVAIR HFA) 45-21 MCG/ACT inhaler Apply 2 puffs into one nostril alternating nostrils daily       hydrochlorothiazide (HYDRODIURIL) 12.5 MG tablet Take 12.5 mg by mouth daily       mometasone (ELOCON) 0.1 % external ointment Apply topically daily Apply to ear canals twice a day for 2 weeks and then as needed 45 g 1     montelukast (SINGULAIR) 10 MG tablet Take 10 mg by mouth daily       mupirocin (BACTROBAN) 2 % external ointment Apply three times daily for 7 days to outer ears 22 g 0     Turmeric 500 MG CAPS Take 500 mg by mouth 2 times daily       EPINEPHrine (ANY BX GENERIC EQUIV) 0.3 MG/0.3ML injection 2-pack Inject 0.3 mLs (0.3 mg) into the muscle once as needed (Patient not taking: Reported on 6/29/2022) 2 each 11            Allergies:  "    Allergies: Ibuprofen, Sulfamethoxazole w/trimethoprim, Hydrocodone-acetaminophen, and Acetaminophen          Past Medical History:     No past medical history on file.         Past Surgical History:     Past Surgical History:   Procedure Laterality Date     ENDOSCOPIC SINUS SURGERY N/A 06/01/2021    Procedure: bilateral endoscopic sinus surgery with polypectomy; extended maxillary antrostomy;  Surgeon: Afua Cowan MD;  Location: [facility]     ORTHOPEDIC SURGERY Right 02/2022    shoulder surgery     TURBINOPLASTY Bilateral 06/01/2021    Procedure: Turbinate reduction;  Surgeon: Afua Cowan MD;  Location: [facility] OR       ENT family history reviewed         Social History:     Social History     Tobacco Use     Smoking status: Never Smoker     Smokeless tobacco: Never Used            Review of Systems:     ROS: See HPI         Physical Exam:     /78 (BP Location: Left arm, Patient Position: Sitting, Cuff Size: Adult Large)   Pulse 78   Temp 98.8  F (37.1  C) (Tympanic)   Ht 1.575 m (5' 2\")   Wt 101.6 kg (224 lb)   SpO2 97%   BMI 40.97 kg/m      General - The patient is well nourished and well developed, and appears to have good nutritional status.  Alert and oriented to person and place, answers questions and cooperates with examination appropriately.   Head and Face - Normocephalic and atraumatic, with no gross asymmetry noted.  The facial nerve is intact, with strong symmetric movements.  Voice and Breathing - The patient was breathing comfortably without the use of accessory muscles. There was no wheezing, stridor. The patients voice was clear and strong, and had appropriate pitch and quality.  Ears - External ear normal. Canals are patent. Right tympanic membrane is intact without effusion, retraction or mass. Left tympanic membrane is intact without effusion, retraction or mass.  Eyes - Extraocular movements intact, and the pupils were reactive to light. Sclera were not icteric or " injected, conjunctiva were pink and moist.   Mouth - Examination of the oral cavity showed pink, healthy oral mucosa. Dentition in good condition. No lesions or ulcerations noted. The tongue was mobile and midline.   Throat - The walls of the oropharynx were smooth, pink, moist, symmetric, and had no lesions or ulcerations.  The tonsillar pillars and soft palate were symmetric. The uvula was midline on elevation.    Neck - Normal midline excursion of the laryngotracheal complex during swallowing.  Full range of motion on passive movement.  Palpation of the occipital, submental, submandibular, internal jugular chain, and supraclavicular nodes did not demonstrate any abnormal lymph nodes or masses.  Palpation of the thyroid was soft and smooth, with no nodules or goiter appreciated.  The trachea was mobile and midline.  Nose - External contour is symmetric, no gross deflection or scars.  Nasal mucosa is pink and moist with no abnormal mucus.     To evaluate the nose and sinuses in the post operative state, I performed rigid nasal endoscopy.  I sprayed both nares with 2 sprays lidocaine and neosynephrine.     I began with the RIGHT side using a 0 degree rigid nasal endoscope, and then similarly examined the LEFT side     Findings: Septum is intact and grossly midline  Inferior turbinates:   Moderately edematous  The right maxillary antrostomy is occluded with severe polypoid tissue, I am unable to visualize the antrostomy.  The left maxillary antrostomy is patent with mucosal thickening.  The middle turbinates, ethmoid cavities bilaterally have moderately severe polypoid tissue without jin purulence.  The superior meatus is examined, mild polypoid change, no purulent  The sphenoethmoidal recess is examined and is without purulence.  Nasopharynx clear, ET patent, no edema  The patient tolerated the procedure well            Assessment and Plan:       ICD-10-CM    1. Nasal polyp  J33.9    2. Chronic pansinusitis  J32.4     3. History of endoscopic sinus surgery  Z98.890      Continue Dupixent    Nasal endoscopic he continues to show significant polypoid change, however there is no purulence or signs of active infection.  Her snot score decreased from 40-18 today, so she is having significant improvement in symptoms.  Start Flonase 2 sprays twice daily    Continue Budesonide instructions  Make the Uzma Med Saline Solution using 2 packages of salt and previously boiled or distilled water.  This will make 240 ml of saline solution.  Mix the entire vial of budesonide into the solution.   Irrigate your nose 2 times a day with the warm budesonide solution using 1 bottle between nostrils in the morning, and one bottle at night.     Start Uzma Med Nasal Saline  Use high volume uzma med saline irrigation.  Use warm distilled water and 2 packets of the salt solution that comes with the bottle, dissolve in bottle up to 240 ml desiree.  Irrigate each side of your nose leaning over the sink, using 1/3 to 1/2 the volume of the bottle in each nostril every irrigation.  Irrigate 1 time daily and as needed.    Follow up with Dr. Cowan in 3-4 months for sinus/nasal polyps recheck     Make an opthalmology appointment for comprehensive eye exam     Donita GRECO  [facility]            Again, thank you for allowing me to participate in the care of your patient.        Sincerely,        Donita Cruz NP

## 2022-06-29 NOTE — PROGRESS NOTES
Otolaryngology Note         Chief Complaint:     Patient presents with:  RECHECK: 2-3 week sinus check            History of Present Illness:     Elaine Lara is a 67 year old female who presents today for follow up.  She was last seen by Dr Cowan on 6/2/22 for post op FESS appointment.      On endoscopic evaluation Dr. Cowan noted purulent secretions in the ethmoid cavity and maxillary antrostomies with severe polypoid degeneration throughout the maxillary and ethmoid cavity and obstructive polyps in the ethmoid cavity.  She was unable to visualize the frontal recess.  The patient was treated with continued budesonide irrigations, oral prednisone taper and Augmentin.  She presents today for follow-up.    She started Dupixent in May of 2021. She has been tolerating well and has noted significant improvement in nasal congestion, rhinorrhea and asthma.  She denies any significant side effects.  She receives her medications from [facility] specialty pharmacy and has not had any difficulties with getting medication.      Today she reports she completed the steroid and abx.  She continues to rinse with budesonide.  She has been feeling good.  She denies fevers, chills, rhinorrhea, facial pain or pressure.  She is able to breath through her nose well without obstruction.      SNOT completed 6/2/2022 with Dr. Cowan was 40. SNOT score today is 18 with moderate problem of decreased sense of smell/taste and frustrated/restless/irritable.        Surgical Details:      Procedures 6/1/21     1.  Bilateral extended maxillary antrostomy with polypectomy  2.  Bilateral revision total ethmoidectomy  3.  Bilateral revision sphenoidotomy  4.  Left frontal sinusotomy  5.  Bilateral submucous reduction inferior turbinates  6.  Modifier 22 for extra 1 hour operating time due to diffuse polyposis and prior surgery     Findings: Severe diffuse polyposis occlusive of the former ethmoid cavity and filling the maxillary  sinuses  Hypoplastic left frontal sinus and aplastic right frontal sinus     > 100 eos on path         Medications:     Current Outpatient Rx   Medication Sig Dispense Refill     albuterol (PROAIR HFA/PROVENTIL HFA/VENTOLIN HFA) 108 (90 Base) MCG/ACT inhaler 1 puff as needed       budesonide (PULMICORT) 0.5 MG/2ML neb solution Squirt entire vial into uzma med saline solution, mix, and irrigate each nostril until entire bottle empty.  Do this twice daily. 200 mL 11     budesonide (PULMICORT) 0.5 MG/2ML neb solution Squirt entire vial into previously made uzma med saline bottle, mix, and irrigate each nostril until entire bottle empty.  Do this twice daily. 60 mL 11     citalopram (CELEXA) 20 MG tablet Take 20 mg by mouth daily       Dupilumab (DUPIXENT) 300 MG/2ML SOPN Inject 300 mg Subcutaneous every 14 days 4 mL 11     fluticasone (FLONASE) 50 MCG/ACT nasal spray Spray 2 sprays into both nostrils daily 16 g 11     fluticasone-salmeterol (ADVAIR HFA) 45-21 MCG/ACT inhaler Apply 2 puffs into one nostril alternating nostrils daily       hydrochlorothiazide (HYDRODIURIL) 12.5 MG tablet Take 12.5 mg by mouth daily       mometasone (ELOCON) 0.1 % external ointment Apply topically daily Apply to ear canals twice a day for 2 weeks and then as needed 45 g 1     montelukast (SINGULAIR) 10 MG tablet Take 10 mg by mouth daily       mupirocin (BACTROBAN) 2 % external ointment Apply three times daily for 7 days to outer ears 22 g 0     Turmeric 500 MG CAPS Take 500 mg by mouth 2 times daily       EPINEPHrine (ANY BX GENERIC EQUIV) 0.3 MG/0.3ML injection 2-pack Inject 0.3 mLs (0.3 mg) into the muscle once as needed (Patient not taking: Reported on 6/29/2022) 2 each 11            Allergies:     Allergies: Ibuprofen, Sulfamethoxazole w/trimethoprim, Hydrocodone-acetaminophen, and Acetaminophen          Past Medical History:     No past medical history on file.         Past Surgical History:     Past Surgical History:   Procedure  "Laterality Date     ENDOSCOPIC SINUS SURGERY N/A 06/01/2021    Procedure: bilateral endoscopic sinus surgery with polypectomy; extended maxillary antrostomy;  Surgeon: Afua Cowan MD;  Location: [facility]     ORTHOPEDIC SURGERY Right 02/2022    shoulder surgery     TURBINOPLASTY Bilateral 06/01/2021    Procedure: Turbinate reduction;  Surgeon: Afua Cowan MD;  Location: [facility] OR       ENT family history reviewed         Social History:     Social History     Tobacco Use     Smoking status: Never Smoker     Smokeless tobacco: Never Used            Review of Systems:     ROS: See HPI         Physical Exam:     /78 (BP Location: Left arm, Patient Position: Sitting, Cuff Size: Adult Large)   Pulse 78   Temp 98.8  F (37.1  C) (Tympanic)   Ht 1.575 m (5' 2\")   Wt 101.6 kg (224 lb)   SpO2 97%   BMI 40.97 kg/m      General - The patient is well nourished and well developed, and appears to have good nutritional status.  Alert and oriented to person and place, answers questions and cooperates with examination appropriately.   Head and Face - Normocephalic and atraumatic, with no gross asymmetry noted.  The facial nerve is intact, with strong symmetric movements.  Voice and Breathing - The patient was breathing comfortably without the use of accessory muscles. There was no wheezing, stridor. The patients voice was clear and strong, and had appropriate pitch and quality.  Ears - External ear normal. Canals are patent. Right tympanic membrane is intact without effusion, retraction or mass. Left tympanic membrane is intact without effusion, retraction or mass.  Eyes - Extraocular movements intact, and the pupils were reactive to light. Sclera were not icteric or injected, conjunctiva were pink and moist.   Mouth - Examination of the oral cavity showed pink, healthy oral mucosa. Dentition in good condition. No lesions or ulcerations noted. The tongue was mobile and midline.   Throat - The walls of the " oropharynx were smooth, pink, moist, symmetric, and had no lesions or ulcerations.  The tonsillar pillars and soft palate were symmetric. The uvula was midline on elevation.    Neck - Normal midline excursion of the laryngotracheal complex during swallowing.  Full range of motion on passive movement.  Palpation of the occipital, submental, submandibular, internal jugular chain, and supraclavicular nodes did not demonstrate any abnormal lymph nodes or masses.  Palpation of the thyroid was soft and smooth, with no nodules or goiter appreciated.  The trachea was mobile and midline.  Nose - External contour is symmetric, no gross deflection or scars.  Nasal mucosa is pink and moist with no abnormal mucus.     To evaluate the nose and sinuses in the post operative state, I performed rigid nasal endoscopy.  I sprayed both nares with 2 sprays lidocaine and neosynephrine.     I began with the RIGHT side using a 0 degree rigid nasal endoscope, and then similarly examined the LEFT side     Findings: Septum is intact and grossly midline  Inferior turbinates:   Moderately edematous  The right maxillary antrostomy is occluded with severe polypoid tissue, I am unable to visualize the antrostomy.  The left maxillary antrostomy is patent with mucosal thickening.  The middle turbinates, ethmoid cavities bilaterally have moderately severe polypoid tissue without jin purulence.  The superior meatus is examined, mild polypoid change, no purulent  The sphenoethmoidal recess is examined and is without purulence.  Nasopharynx clear, ET patent, no edema  The patient tolerated the procedure well            Assessment and Plan:       ICD-10-CM    1. Nasal polyp  J33.9    2. Chronic pansinusitis  J32.4    3. History of endoscopic sinus surgery  Z98.890      Continue Dupixent    Nasal endoscopic he continues to show significant polypoid change, however there is no purulence or signs of active infection.  Her snot score decreased from 40-18  today, so she is having significant improvement in symptoms.  Start Flonase 2 sprays twice daily    Continue Budesonide instructions  Make the Uzma Med Saline Solution using 2 packages of salt and previously boiled or distilled water.  This will make 240 ml of saline solution.  Mix the entire vial of budesonide into the solution.   Irrigate your nose 2 times a day with the warm budesonide solution using 1 bottle between nostrils in the morning, and one bottle at night.     Start Uzma Med Nasal Saline  Use high volume uzma med saline irrigation.  Use warm distilled water and 2 packets of the salt solution that comes with the bottle, dissolve in bottle up to 240 ml desiree.  Irrigate each side of your nose leaning over the sink, using 1/3 to 1/2 the volume of the bottle in each nostril every irrigation.  Irrigate 1 time daily and as needed.    Follow up with Dr. Cowan in 3-4 months for sinus/nasal polyps recheck     Make an opthalmology appointment for comprehensive eye exam     Donita GRECO  [facility]

## 2022-09-07 DIAGNOSIS — J33.9 NASAL POLYP: Primary | ICD-10-CM

## 2022-09-07 RX ORDER — DUPILUMAB 300 MG/2ML
300 INJECTION, SOLUTION SUBCUTANEOUS
Qty: 4 ML | Refills: 11 | Status: SHIPPED | OUTPATIENT
Start: 2022-09-07 | End: 2023-01-19

## 2022-09-07 NOTE — TELEPHONE ENCOUNTER
Received a fax from [employer].  They need an updated order for her Dupixent.  [facility] stated that they were informed that she no longer has insurance coverage, so they would like a new order, to see if she qualifies for assistance through the [employer] program.  New order pended for review and signature.  Thank you!    Carlos Colon RN on 9/7/2022 at 2:25 PM

## 2022-12-27 PROBLEM — Z96.611 STATUS POST REVERSE TOTAL REPLACEMENT OF RIGHT SHOULDER: Status: ACTIVE | Noted: 2022-12-27

## 2023-01-17 NOTE — PROGRESS NOTES
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     She has been off of her Dupixent since August due to a workers comp issue with not paying for her insurance    Using budesonide irrigations, flonase, AH  Asthma has been worsening with progressive wheezing      SNOT todays date 40 with a problem as bad as it can be with decreased sense of smell and taste, moderate problem with need to blow nose postnasal discharge thick nasal discharge and sleep difficulty       Procedures 6/1/21     1.  Bilateral extended maxillary antrostomy with polypectomy  2.  Bilateral revision total ethmoidectomy  3.  Bilateral revision sphenoidotomy  4.  Left frontal sinusotomy  5.  Bilateral submucous reduction inferior turbinates  6.  Modifier 22 for extra 1 hour operating time due to diffuse polyposis and prior surgery     Findings: Severe diffuse polyposis occlusive of the former ethmoid cavity and filling the maxillary sinuses  Hypoplastic left frontal sinus and aplastic right frontal sinus     > 100 eos on path    ROS:, Negative aside from that mentioned in history present illness and GERD,Night sweats      Physical Exam  BP (!) 141/70 (BP Location: Left arm, Cuff Size: Adult Large)   Pulse 59   Temp 98.2  F (36.8  C) (Tympanic)   Ht 1.575 m (5' 2\")   Wt 108.9 kg (240 lb)   SpO2 98%   BMI 43.90 kg/m      General - The patient is well nourished and well developed, and appears to have good nutritional status.  Alert and oriented to person and place, interactive.  Head and Face - Normocephalic and atraumatic, with " no gross asymmetry noted of the contour of the facial features.  The facial nerve is intact, with strong symmetric movements.  Eyes - Extraocular movements intact.   Nose - Nasal mucosa is pink and moist    To evaluate the nose and sinuses in the post operative state, I performed rigid nasal endoscopy. The nose was anesthetized with home afrin or topical lidocaine and neosynephrine in the office.    I began with the LEFT side using a 0 degree rigid nasal endoscope, and then similarly examined the RIGHT side    Findings:  Inferior turbinates:  Lateralized  Filling polyps throughout the lateral nasal wall occluding the antrostomy his ethmoid cavity and frontal recess.  I used an 8 Watts 12 Watts and curved suction to remove viscous allergic mucin.    Impression/Plan        ICD-10-CM    1. Moderate persistent asthma, unspecified whether complicated  J45.40       2. 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 Nasal polyp  J33.9 fluticasone (FLONASE) 50 MCG/ACT nasal spray     dupilumab (DUPIXENT) 300 MG/2ML prefilled pen     predniSONE (DELTASONE) 10 MG tablet      4. History of endoscopic sinus surgery  Z98.890           Restart Dupixent     Unfortunately due to insurance mishap, she has regressed     Risks of oral steroid use were discussed and include psychiatric/mood changes, insomnia, stomach ulcers and potential GI bleeding, blood sugar elevation/worsening diabetes, hip/bone necrosis called avascular necrosis, or bone demineralization.        Continue budesonide irrigations as indicated  Prn use uzma med saline    Precautions with anosmia reinforced    Follow-up in 3 months

## 2023-01-19 ENCOUNTER — OFFICE VISIT (OUTPATIENT)
Dept: OTOLARYNGOLOGY | Facility: OTHER | Age: 68
End: 2023-01-19
Attending: OTOLARYNGOLOGY
Payer: MEDICARE

## 2023-01-19 ENCOUNTER — TELEPHONE (OUTPATIENT)
Dept: ALLERGY | Facility: OTHER | Age: 68
End: 2023-01-19

## 2023-01-19 VITALS
BODY MASS INDEX: 44.16 KG/M2 | OXYGEN SATURATION: 98 % | HEIGHT: 62 IN | DIASTOLIC BLOOD PRESSURE: 70 MMHG | WEIGHT: 240 LBS | HEART RATE: 59 BPM | TEMPERATURE: 98.2 F | SYSTOLIC BLOOD PRESSURE: 141 MMHG

## 2023-01-19 DIAGNOSIS — J33.9 NASAL POLYP: ICD-10-CM

## 2023-01-19 DIAGNOSIS — J32.4 CHRONIC PANSINUSITIS: ICD-10-CM

## 2023-01-19 DIAGNOSIS — Z98.890 HISTORY OF ENDOSCOPIC SINUS SURGERY: ICD-10-CM

## 2023-01-19 DIAGNOSIS — J45.40 MODERATE PERSISTENT ASTHMA, UNSPECIFIED WHETHER COMPLICATED: Primary | ICD-10-CM

## 2023-01-19 PROCEDURE — 31237 NSL/SINS NDSC SURG BX POLYPC: CPT | Performed by: OTOLARYNGOLOGY

## 2023-01-19 PROCEDURE — G0463 HOSPITAL OUTPT CLINIC VISIT: HCPCS | Mod: 25

## 2023-01-19 PROCEDURE — 99214 OFFICE O/P EST MOD 30 MIN: CPT | Mod: 25 | Performed by: OTOLARYNGOLOGY

## 2023-01-19 RX ORDER — FLUTICASONE PROPIONATE 50 MCG
2 SPRAY, SUSPENSION (ML) NASAL DAILY
Qty: 16 G | Refills: 11 | Status: SHIPPED | OUTPATIENT
Start: 2023-01-19

## 2023-01-19 RX ORDER — BUDESONIDE 0.5 MG/2ML
INHALANT ORAL
Qty: 60 ML | Refills: 11 | Status: SHIPPED | OUTPATIENT
Start: 2023-01-19 | End: 2023-04-20

## 2023-01-19 RX ORDER — DUPILUMAB 300 MG/2ML
300 INJECTION, SOLUTION SUBCUTANEOUS
Qty: 4 ML | Refills: 11 | Status: SHIPPED | OUTPATIENT
Start: 2023-01-19 | End: 2023-01-19

## 2023-01-19 RX ORDER — PREDNISONE 10 MG/1
TABLET ORAL
Qty: 26 TABLET | Refills: 0 | Status: SHIPPED | OUTPATIENT
Start: 2023-01-19 | End: 2023-02-04

## 2023-01-19 ASSESSMENT — PAIN SCALES - GENERAL: PAINLEVEL: NO PAIN (0)

## 2023-01-19 NOTE — PATIENT INSTRUCTIONS
Thank you for allowing Dr. Cowan and our ENT team to participate in your care.  If your medications are too expensive, please give the nurse a call.  We can possibly change this medication.  If you have a scheduling or an appointment question please contact our Health Unit Coordinator at their direct line [phone].   ALL nursing questions or concerns can be directed to your ENT nurse at: [phone] - [place]    Take the prednisone as prescribed    Re-Start Dupixent    Continue Budesonide Rinses Twice Daily    Follow up with ENT in 3 months      Budesonide instructions  Make the Aamir Med Saline Solution using 2 packages of salt and previously boiled or distilled water.  This will make 240 ml of saline solution.  Mix the entire vial of budesonide into the solution.   Irrigate your nose 2 times a day with the warm budesonide solution using 1 bottle between nostrils in the morning, and one bottle at night.

## 2023-01-19 NOTE — TELEPHONE ENCOUNTER
Rx changed to [facility].  Please review and sign, thank you!    Carlos Colon RN on 1/19/2023 at 3:13 PM

## 2023-01-19 NOTE — LETTER
"    1/19/2023         RE: Elaine Lara  [address]  [facility] [address]        Dear Colleague,    Thank you for referring your patient, Elaine Lara, to the [facility]. Please see a copy of my visit note below.    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     She has been off of her Dupixent since August due to a workers comp issue with not paying for her insurance    Using budesonide irrigations, flonase, AH  Asthma has been worsening with progressive wheezing      SNOT todays date 42 with a problem as bad as it can be with decreased sense of smell and taste, moderate problem with need to blow nose postnasal discharge thick nasal discharge and sleep difficulty       Procedures 6/1/21     1.  Bilateral extended maxillary antrostomy with polypectomy  2.  Bilateral revision total ethmoidectomy  3.  Bilateral revision sphenoidotomy  4.  Left frontal sinusotomy  5.  Bilateral submucous reduction inferior turbinates  6.  Modifier 22 for extra 1 hour operating time due to diffuse polyposis and prior surgery     Findings: Severe diffuse polyposis occlusive of the former ethmoid cavity and filling the maxillary sinuses  Hypoplastic left frontal sinus and aplastic right frontal sinus     > 100 eos on path    ROS:, Negative aside from that mentioned in history present illness and GERD,Night sweats      Physical Exam  BP (!) 141/70 (BP Location: Left arm, Cuff Size: Adult Large)   Pulse 59   Temp 98.2  F (36.8  C) (Tympanic)   Ht 1.575 m (5' 2\")   Wt 108.9 kg (240 lb)   SpO2 " 98%   BMI 43.90 kg/m      General - The patient is well nourished and well developed, and appears to have good nutritional status.  Alert and oriented to person and place, interactive.  Head and Face - Normocephalic and atraumatic, with no gross asymmetry noted of the contour of the facial features.  The facial nerve is intact, with strong symmetric movements.  Eyes - Extraocular movements intact.   Nose - Nasal mucosa is pink and moist    To evaluate the nose and sinuses in the post operative state, I performed rigid nasal endoscopy. The nose was anesthetized with home afrin or topical lidocaine and neosynephrine in the office.    I began with the LEFT side using a 0 degree rigid nasal endoscope, and then similarly examined the RIGHT side    Findings:  Inferior turbinates:  Lateralized  Filling polyps throughout the lateral nasal wall occluding the antrostomy his ethmoid cavity and frontal recess.  I used an 8 Watts 12 Watts and curved suction to remove viscous allergic mucin.    Impression/Plan        ICD-10-CM    1. Moderate persistent asthma, unspecified whether complicated  J45.40       2. 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 Nasal polyp  J33.9 fluticasone (FLONASE) 50 MCG/ACT nasal spray     dupilumab (DUPIXENT) 300 MG/2ML prefilled pen     predniSONE (DELTASONE) 10 MG tablet      4. History of endoscopic sinus surgery  Z98.890           Restart Dupixent     Unfortunately due to insurance mishap, she has regressed     Risks of oral steroid use were discussed and include psychiatric/mood changes, insomnia, stomach ulcers and potential GI bleeding, blood sugar elevation/worsening diabetes, hip/bone necrosis called avascular necrosis, or bone demineralization.        Continue budesonide irrigations as  indicated  Prn use uzma med saline    Precautions with anosmia reinforced    Follow-up in 3 months              Again, thank you for allowing me to participate in the care of your patient.        Sincerely,        Afua Cowan MD

## 2023-01-23 RX ORDER — DUPILUMAB 300 MG/2ML
300 INJECTION, SOLUTION SUBCUTANEOUS
Qty: 4 ML | Refills: 11 | Status: SHIPPED | OUTPATIENT
Start: 2023-01-23

## 2023-01-31 ENCOUNTER — TELEPHONE (OUTPATIENT)
Dept: OTOLARYNGOLOGY | Facility: OTHER | Age: 68
End: 2023-01-31

## 2023-01-31 NOTE — TELEPHONE ENCOUNTER
Pt calling and co-pay the Dupixent is 1033 dollars for a month.    Noted there is a PA initiation from 1.25.2023.    Please advise.    Call back [phone]      Izabela Salmon RN

## 2023-01-31 NOTE — TELEPHONE ENCOUNTER
Patient returned a call and was notified of the pending PA, and given the number to call them in [place].  Also informed her that her paperwork was faxed to [employer] [facility] too.  Advised her that the only medication  prescribes is Dupxent, and no other biologics at this time.    Carlos Colon RN on 1/31/2023 at 11:11 AM

## 2023-01-31 NOTE — TELEPHONE ENCOUNTER
Left message with patient that there was a prior authorization started on Dupixent on 1/25. If she has any further questions to give us a call back.     Siomara Holland RN on 1/31/2023 at 10:12 AM

## 2023-02-02 ENCOUNTER — TELEPHONE (OUTPATIENT)
Dept: ALLERGY | Facility: OTHER | Age: 68
End: 2023-02-02

## 2023-02-02 NOTE — TELEPHONE ENCOUNTER
Received a fax and e-mail from [employer].  This patient is on Medicare now,  so the high copay can no longer be covered by a copay card (like before she was on Medicare).    [employer] is processing a benefit investigation to evaluate her for their patient assistance (free drug) program for Medicare patients. There are 2 things she will need to complete this evaluation and obtain a decision:    1.Patient must speak to [employer] and express that her copay is not affordable  2.Patient must provide the 2nd signature (she can do this electronically by asking [employer] to send her a link to sign online) that was missing on her enrollment form     Once these are done they will verify her income  (if she is approved), it goes through the end of 2023 - at which time she can renew).    She can either wait for a call from [employer] or call them directly [phone].  Called patient.  She was given the phone number to contact [employer].  She verbalized understanding.    Carlos Colon RN on 2/2/2023 at 9:14 AM

## 2023-03-01 ENCOUNTER — TELEPHONE (OUTPATIENT)
Dept: OTOLARYNGOLOGY | Facility: OTHER | Age: 68
End: 2023-03-01

## 2023-03-01 DIAGNOSIS — J32.4 CHRONIC PANSINUSITIS: Primary | ICD-10-CM

## 2023-03-01 NOTE — TELEPHONE ENCOUNTER
Patient left a voicemail at the end of the 3/1/23.  Patient is requesting a prescription for a sinus infection.    Pt # is [phone]     Thanks!

## 2023-03-02 RX ORDER — CEFDINIR 300 MG/1
300 CAPSULE ORAL 2 TIMES DAILY
Qty: 28 CAPSULE | Refills: 0 | Status: SHIPPED | OUTPATIENT
Start: 2023-03-02 | End: 2023-03-16

## 2023-03-02 NOTE — TELEPHONE ENCOUNTER
This patient has c/o a sinus infection. She has c/o congestion and nasal drainage. She is requesting an antibiotic. Please Advise.

## 2023-03-29 ENCOUNTER — TELEPHONE (OUTPATIENT)
Dept: ALLERGY | Facility: OTHER | Age: 68
End: 2023-03-29

## 2023-03-29 NOTE — TELEPHONE ENCOUNTER
Patient called regarding her Dupixent Rx, because it was cancelled through [employer].  Explained to patient that her Rx would come from a specialty pharmacy, whether it be [facility], [facility] or [facility].   Patient stated that she sent her financial information to [employer] last week.  Writer then called [employer] to check on the status.  Per [employer], they received her financial information.  They will complete the assessment, and then forward it to the peer review team.  The response is usually takes 24-48hrs.  Called patient to notify her of this.    Carlos Colon RN on 3/29/2023 at 2:48 PM

## 2023-04-06 NOTE — TELEPHONE ENCOUNTER
Received a fax from [employer] dated 03/30/23, that she was approved through the [facility] patient assistance program through 12/31/23.  Called patient and she already received her shipment and she has taken her first dose.    Carlos Colon RN on 4/6/2023 at 9:42 AM

## 2023-04-19 NOTE — PROGRESS NOTES
"Otolaryngology Progress Note            Presents for their one month postoperative visit with me status post endoscopic sinus surgery     Fu of eosinophilic CRWwNP and AEAD     Dupixent TX was interrupted due to insurance   Restarted Dupixent on 4/1    Has noticed improved nasal breathing    SNOT today 38 with a moderate problem with sneezing and decreased sense of smell    Retired, enjoying grandchildren    Last eval: 1/19 at that point she is using budesonide irrigations Flonase and antihistamines but her asthma has been worsening with progressive wheezing since she had been off of her Dupixent.    Snot was 48 at her last visit         Procedures 6/1/21     1.  Bilateral extended maxillary antrostomy with polypectomy  2.  Bilateral revision total ethmoidectomy  3.  Bilateral revision sphenoidotomy  4.  Left frontal sinusotomy  5.  Bilateral submucous reduction inferior turbinates  6.  Modifier 22 for extra 1 hour operating time due to diffuse polyposis and prior surgery     Findings: Severe diffuse polyposis occlusive of the former ethmoid cavity and filling the maxillary sinuses  Hypoplastic left frontal sinus and aplastic right frontal sinus     > 100 eos on path    Physical Exam  /78 (Cuff Size: Adult Large)   Pulse 65   Temp 98.2  F (36.8  C) (Tympanic)   Ht 1.575 m (5' 2\")   Wt 107.5 kg (237 lb)   SpO2 97%   BMI 43.35 kg/m      General - The patient is well nourished and well developed, and appears to have good nutritional status.  Alert and oriented to person and place, interactive.  Head and Face - Normocephalic and atraumatic, with no gross asymmetry noted of the contour of the facial features.  The facial nerve is intact, with strong symmetric movements.  Eyes - Extraocular movements intact.   Nose - Nasal mucosa is pink and moist with no abnormal mucus.  The septum was grossly midline, turbinates are without excess edema.  No polyps, masses, or purulence noted on examination.      To evaluate " the nose and sinuses in the post operative state, I performed rigid nasal endoscopy. The nose was anesthetized with home afrin or topical lidocaine and neosynephrine in the office.    I began with the LEFT side using a 0 degree rigid nasal endoscope, and then similarly examined the RIGHT side    Findings:  Inferior turbinates:  Lateralized  I used a cabezas suction to remove viscous secretions from the ethmoid cavity and maxillary antrostomies   Filling mucoid secretions right maxillary sinus  Middle turbinate and middle meatus:    Antrostomy clear but polypoid mucosa  Ethmoid cavity polypoid   The superior meatus and frontal recess are narrowed secondary to polypoid degeneration but improved from last visit  The sphenoethmoid recess is clear  The nasopharynx is clear      Impression/Plan      ICD-10-CM    1. Chronic pansinusitis  J32.4       2. Nasal polyposis  J33.9       3. Moderate persistent asthma without complication  J45.40       4. 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

## 2023-04-20 ENCOUNTER — OFFICE VISIT (OUTPATIENT)
Dept: OTOLARYNGOLOGY | Facility: OTHER | Age: 68
End: 2023-04-20
Attending: OTOLARYNGOLOGY
Payer: MEDICARE

## 2023-04-20 VITALS
BODY MASS INDEX: 43.61 KG/M2 | HEART RATE: 65 BPM | SYSTOLIC BLOOD PRESSURE: 136 MMHG | TEMPERATURE: 98.2 F | HEIGHT: 62 IN | WEIGHT: 237 LBS | OXYGEN SATURATION: 97 % | DIASTOLIC BLOOD PRESSURE: 78 MMHG

## 2023-04-20 DIAGNOSIS — J45.40 MODERATE PERSISTENT ASTHMA WITHOUT COMPLICATION: ICD-10-CM

## 2023-04-20 DIAGNOSIS — Z98.890 HISTORY OF ENDOSCOPIC SINUS SURGERY: ICD-10-CM

## 2023-04-20 DIAGNOSIS — J32.4 CHRONIC PANSINUSITIS: Primary | ICD-10-CM

## 2023-04-20 DIAGNOSIS — J33.9 NASAL POLYPOSIS: ICD-10-CM

## 2023-04-20 DIAGNOSIS — J33.1 POLYPOID SINUS DEGENERATION: ICD-10-CM

## 2023-04-20 PROCEDURE — 99213 OFFICE O/P EST LOW 20 MIN: CPT | Mod: 25 | Performed by: OTOLARYNGOLOGY

## 2023-04-20 PROCEDURE — 31231 NASAL ENDOSCOPY DX: CPT | Performed by: OTOLARYNGOLOGY

## 2023-04-20 PROCEDURE — 31237 NSL/SINS NDSC SURG BX POLYPC: CPT | Performed by: OTOLARYNGOLOGY

## 2023-04-20 PROCEDURE — G0463 HOSPITAL OUTPT CLINIC VISIT: HCPCS

## 2023-04-20 ASSESSMENT — PAIN SCALES - GENERAL: PAINLEVEL: NO PAIN (0)

## 2023-04-20 NOTE — PATIENT INSTRUCTIONS
Thank you for allowing Dr. Cowan and our ENT team to participate in your care.  If your medications are too expensive, please give the nurse a call.  We can possibly change this medication.  If you have a scheduling or an appointment question please contact our Health Unit Coordinator at their direct line [phone].   ALL nursing questions or concerns can be directed to your ENT nurse at: [phone] - [place]    Continue Budesonide Rinses  Continue Dupixent  Follow up with Noemy or Megan in 6 months    Budesonide instructions  Make the Aamir Med Saline Solution using 2 packages of salt and previously boiled or distilled water.  This will make 240 ml of saline solution.  Mix the entire vial of budesonide into the solution.   Irrigate your nose 2 times a day with the warm budesonide solution using 1 bottle between nostrils in the morning, and one bottle at night.

## 2023-04-20 NOTE — LETTER
"    4/20/2023         RE: Elaine Lara  [address] [facility] [facility] [place] [address]        Dear Colleague,    Thank you for referring your patient, Elaine Lara, to the [facility]. Please see a copy of my visit note below.    Otolaryngology Progress Note            Presents for their one month postoperative visit with me status post endoscopic sinus surgery     Fu of eosinophilic CRWwNP and AEAD     Dupixent TX was interrupted due to insurance   Restarted Dupixent on 4/1    Has noticed improved nasal breathing    SNOT today 38 with a moderate problem with sneezing and decreased sense of smell    Retired, enjoying grandchildren    Last eval: 1/19 at that point she is using budesonide irrigations Flonase and antihistamines but her asthma has been worsening with progressive wheezing since she had been off of her Dupixent.    Snot was 48 at her last visit         Procedures 6/1/21     1.  Bilateral extended maxillary antrostomy with polypectomy  2.  Bilateral revision total ethmoidectomy  3.  Bilateral revision sphenoidotomy  4.  Left frontal sinusotomy  5.  Bilateral submucous reduction inferior turbinates  6.  Modifier 22 for extra 1 hour operating time due to diffuse polyposis and prior surgery     Findings: Severe diffuse polyposis occlusive of the former ethmoid cavity and filling the maxillary sinuses  Hypoplastic left frontal sinus and aplastic right frontal sinus     > 100 eos on path    Physical Exam  /78 (Cuff Size: Adult Large)   Pulse 65   Temp 98.2  F (36.8  C) (Tympanic)   Ht 1.575 m (5' 2\")   Wt 107.5 kg (237 lb)   SpO2 97%   BMI 43.35 kg/m      General - The patient is well nourished and well developed, and appears to have good nutritional status.  Alert and oriented to person and place, interactive.  Head and Face - Normocephalic and atraumatic, with no gross asymmetry noted of the contour of the facial features.  The facial nerve is intact, with strong symmetric " movements.  Eyes - Extraocular movements intact.   Nose - Nasal mucosa is pink and moist with no abnormal mucus.  The septum was grossly midline, turbinates are without excess edema.  No polyps, masses, or purulence noted on examination.      To evaluate the nose and sinuses in the post operative state, I performed rigid nasal endoscopy. The nose was anesthetized with home afrin or topical lidocaine and neosynephrine in the office.    I began with the LEFT side using a 0 degree rigid nasal endoscope, and then similarly examined the RIGHT side    Findings:  Inferior turbinates:  Lateralized  I used a cabezas suction to remove viscous secretions from the ethmoid cavity and maxillary antrostomies   Filling mucoid secretions right maxillary sinus  Middle turbinate and middle meatus:    Antrostomy clear but polypoid mucosa  Ethmoid cavity polypoid   The superior meatus and frontal recess are narrowed secondary to polypoid degeneration but improved from last visit  The sphenoethmoid recess is clear  The nasopharynx is clear      Impression/Plan      ICD-10-CM    1. Chronic pansinusitis  J32.4       2. Nasal polyposis  J33.9       3. Moderate persistent asthma without complication  J45.40       4. 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        Sincerely,        Afua Cowan MD

## 2023-04-26 ENCOUNTER — TELEPHONE (OUTPATIENT)
Dept: OTOLARYNGOLOGY | Facility: OTHER | Age: 68
End: 2023-04-26

## 2023-04-26 NOTE — TELEPHONE ENCOUNTER
Patient called to see if she has started Dupixent. Patient stated that she had done her first injection on 4/1 and her second one on 4/15. Will update provider.    Siomara Holland RN on 4/26/2023 at 2:35 PM

## 2023-08-03 ENCOUNTER — TELEPHONE (OUTPATIENT)
Dept: OTOLARYNGOLOGY | Facility: OTHER | Age: 68
End: 2023-08-03

## 2023-12-04 ENCOUNTER — TELEPHONE (OUTPATIENT)
Dept: OTOLARYNGOLOGY | Facility: OTHER | Age: 68
End: 2023-12-04

## 2023-12-04 NOTE — TELEPHONE ENCOUNTER
Per [employer], patient's re-enrollment form was received and is on file. She doesn't have any other requirements at this time. She will be due for an update on her HIPAA signature and Patient Identification signature in February. Called to notify the patient of this. No response. Left secure voicemail with the above information, and provided two methods of updating her signatures. Left a call back number in case the patient has any further questions or concerns.

## 2023-12-04 NOTE — TELEPHONE ENCOUNTER
Received noticed that patient is due for her Medicare Part D re-enrollment for Dupixent. Called patient to notify her. Patient reports she completed and sent in the form. Will call [employer] to verify they got it, and notify patient if she needs anything else.

## 2024-01-10 ENCOUNTER — TELEPHONE (OUTPATIENT)
Dept: OTOLARYNGOLOGY | Facility: OTHER | Age: 69
End: 2024-01-10

## 2024-02-01 ENCOUNTER — TELEPHONE (OUTPATIENT)
Dept: OTOLARYNGOLOGY | Facility: OTHER | Age: 69
End: 2024-02-01

## 2024-10-07 ENCOUNTER — TRANSFERRED RECORDS (OUTPATIENT)
Dept: HEALTH INFORMATION MANAGEMENT | Facility: CLINIC | Age: 69
End: 2024-10-07

## 2024-10-07 LAB
ALT SERPL-CCNC: 19 U/L (ref 0–35)
AST SERPL-CCNC: 25 U/L (ref 14–36)
CHOLESTEROL (EXTERNAL): 154 MG/DL (ref 0–200)
CREATININE (EXTERNAL): 0.7 MG/DL (ref 0.52–1.25)
GFR ESTIMATED (EXTERNAL): 94.4 ML/MIN/1.73M2
GLUCOSE (EXTERNAL): 112 MG/DL (ref 74–106)
HBA1C MFR BLD: 5.7 % (ref 3–6)
HDLC SERPL-MCNC: 57 MG/DL (ref 40–60)
LDL CHOLESTEROL CALCULATED (EXTERNAL): 72 MG/DL (ref 0–130)
POTASSIUM (EXTERNAL): 3.8 MMOL/L (ref 3.5–5.1)
TRIGLYCERIDES (EXTERNAL): 124 MG/DL (ref 0–150)

## 2024-10-08 ENCOUNTER — TRANSFERRED RECORDS (OUTPATIENT)
Dept: HEALTH INFORMATION MANAGEMENT | Facility: CLINIC | Age: 69
End: 2024-10-08

## 2024-10-08 ENCOUNTER — TELEPHONE (OUTPATIENT)
Dept: OTOLARYNGOLOGY | Facility: OTHER | Age: 69
End: 2024-10-08

## 2024-10-08 NOTE — TELEPHONE ENCOUNTER
Patient reports she has been having right eye pain/ blurry vision, dizziness, headache.Patient had CT done today. Results pending.  She is wondering if her Dupixent could be causing these symptoms. She has been taking it for 3 years without concerns. Symptoms started Friday 10/4 while she was at work, but she reports she can't think of anything that would have caused them. They started out of nowhere. Patient is wondering if she should reduce Dupixent dose or take something else. She doesn't want her nasal polyps to return, so she would like to continue Dupixent if needed. Will discuss with provider and call her back.

## 2024-10-09 NOTE — TELEPHONE ENCOUNTER
She should be seen in ENT last OV was 4/20/23. Nasal exam was recommended to be completed in 6 months.     Headache/ dizziness are possible side effects from Dupixent. Has she been evaluated by PCP/ UC for exam. Possible migraine?   Will need Head CT report.

## 2024-10-14 NOTE — PROGRESS NOTES
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   Resolved completely after 6 days.  No current eye complaints.  No flux HL or associated ear concerns    She has no current eye concerns    Took apap with some relief.      Significant hx migraines prior to   FESS, no aura  No chronic headaches    Saw ophthalmology last spring, optho kaycet, told normal exam.     SNOT 20 today with very mild sneezing, dizziness, mild to slight anosmia, no sinus or nasal complaints    Dupixent started 5/27/21. 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    She is compliant with budesonide irrigations      CT head dated 10/8/2024 from [facility] in [place] was negative.  Mucoperiosteal thickening sinuses noted.   Not loaded in pacs    Dr. Petra Moss MD [facility] notes 10/7/24 visit: Acute onset headache, frontal in location accompanied by right   eye pain no change in vision  Labs reviewed 10/7/2024 no leukocytosis, sed rate 10    Procedures 6/1/21     1.  Bilateral extended maxillary antrostomy with polypectomy  2.  Bilateral revision total ethmoidectomy  3.  Bilateral revision sphenoidotomy  4.  Left frontal sinusotomy  5.  Bilateral submucous reduction inferior turbinates  6.  Modifier 22 for extra 1 hour operating time due to diffuse polyposis and prior surgery     Findings: Severe diffuse polyposis occlusive of the former ethmoid cavity and filling the maxillary sinuses  Hypoplastic left frontal sinus and aplastic right frontal sinus     > 100 eos on path    ROS: 12 point review of systems is negative aside from that  "mentioned in history present all numbness and night sweats    Physical Exam  /80 (BP Location: Left arm, Patient Position: Sitting, Cuff Size: Adult Large)   Pulse 62   Temp 97.7  F (36.5  C) (Tympanic)   Resp 16   Ht 1.575 m (5' 2.01\")   Wt 107.5 kg (237 lb)   SpO2 98%   BMI 43.34 kg/m    General - The patient is well nourished and well developed, and appears to have good nutritional status.  Alert and oriented to person and place, interactive.  Head and Face - Normocephalic and atraumatic, with no gross asymmetry noted of the contour of the facial features.  The facial nerve is intact, with strong symmetric movements.  Neck-no palpable lymphadenopathy or thyroid mass.  Trachea is midline.  Eyes - Extraocular movements intact.   Ears- External auditory canals are patent, right canal dry mildly erythematous,  tympanic membranes are intact without effusion or worrisome retractions   Nose - Nasal mucosa is pink and moist with no abnormal mucus.  The septum was grossly midline and non-obstructive, turbinates of normal size and position.  No polyps, masses, or purulence noted on examination.  Mouth - Examination of the oral cavity shows pink, healthy, moist mucosa.  No lesions or ulceration noted.  The dentition are in good repair.  The tongue is mobile and midline.  Throat - The walls of the oropharynx were smooth, pink, moist, symmetric, and had no lesions or ulcerations.  The tonsillar pillars and soft palate were symmetric.  The uvula was midline on elevation.        To evaluate the nose and sinuses, I performed rigid nasal endoscopy.  I applied topical nasal lidocaine and neosynephrine.    I began with the LEFT side using a 0 degree rigid nasal endoscope, and then similarly examined the RIGHT side    Findings:  Inferior turbinates:  2+  Dried crusty secretions removed from left extended antrostomy with blakesely, suction  Mild polypoid deg left max  Right max clear  Middle turbinate and middle meatus:  " No purulence, no polyposis  Ethmoid cavities clear  Sphenoidotomy sites patent  Superior meatus was evaluated  Frontal recess clear  Mucosa is otherwise healthy throughout,  no obstructive polyps nor concerning polypoid degeneration  Sphenoethmoidal recess without purulence   Nasopharynx clear  The patient tolerated the procedure well    Impression/Plan  Elaine Lara is a 69 year old female    ICD-10-CM    1. Chronic pansinusitis  J32.4 lidocaine 2%-oxymetazoline 0.025% nasal solution 2 spray      2. History of nasal polyps, resolved  Z87.09       3. Migraine without aura and without status migrainosus, not intractable  G43.009       4. h/o FESS (functional endoscopic sinus surgery)  Z98.890         Excellent response to dupixent and post distant FESS  Mucosa overall perfect, slight polypoid change left max, not obstructive        Follow-up with ophthalmology    Continue dupixent, sinuses look great  Continue budesonide irrigations bid     No evidence of obvious dupixent side effects      Follow-up with me in 1 year    Possible dupixent eye side effects:  (none of which are apparent today, but defer to optho)    Dry eye  Itchy eyes  Inflammation of the whites of the eye (conjunctivitis)  Inflammation of the cornea (keratitis)  Swelling of the eyelids (blepharitis)  Infection    Afua Cowan D.O.  Otolaryngology/Head and Neck Surgery  Allergy

## 2024-10-15 ENCOUNTER — TELEPHONE (OUTPATIENT)
Dept: ALLERGY | Facility: OTHER | Age: 69
End: 2024-10-15

## 2024-10-15 ENCOUNTER — OFFICE VISIT (OUTPATIENT)
Dept: OTOLARYNGOLOGY | Facility: OTHER | Age: 69
End: 2024-10-15
Attending: OTOLARYNGOLOGY
Payer: COMMERCIAL

## 2024-10-15 VITALS
HEART RATE: 62 BPM | BODY MASS INDEX: 43.61 KG/M2 | TEMPERATURE: 97.7 F | RESPIRATION RATE: 16 BRPM | DIASTOLIC BLOOD PRESSURE: 80 MMHG | SYSTOLIC BLOOD PRESSURE: 133 MMHG | HEIGHT: 62 IN | OXYGEN SATURATION: 98 % | WEIGHT: 237 LBS

## 2024-10-15 DIAGNOSIS — J33.9 NASAL POLYP: Primary | ICD-10-CM

## 2024-10-15 DIAGNOSIS — G43.009 MIGRAINE WITHOUT AURA AND WITHOUT STATUS MIGRAINOSUS, NOT INTRACTABLE: ICD-10-CM

## 2024-10-15 DIAGNOSIS — Z87.09 HISTORY OF NASAL POLYP: ICD-10-CM

## 2024-10-15 DIAGNOSIS — J32.4 CHRONIC PANSINUSITIS: Primary | ICD-10-CM

## 2024-10-15 DIAGNOSIS — Z98.890 S/P FESS (FUNCTIONAL ENDOSCOPIC SINUS SURGERY): ICD-10-CM

## 2024-10-15 PROCEDURE — G0463 HOSPITAL OUTPT CLINIC VISIT: HCPCS

## 2024-10-15 PROCEDURE — 99213 OFFICE O/P EST LOW 20 MIN: CPT | Mod: 25 | Performed by: OTOLARYNGOLOGY

## 2024-10-15 PROCEDURE — 31231 NASAL ENDOSCOPY DX: CPT | Performed by: OTOLARYNGOLOGY

## 2024-10-15 PROCEDURE — 31237 NSL/SINS NDSC SURG BX POLYPC: CPT | Performed by: OTOLARYNGOLOGY

## 2024-10-15 ASSESSMENT — PAIN SCALES - GENERAL: PAINLEVEL: NO PAIN (0)

## 2024-10-15 NOTE — LETTER
10/15/2024      Elaine Lara  [address] [facility] [address]      Dear Colleague,    Thank you for referring your patient, Elaine Lara, to the [facility]. Please see a copy of my visit note below.    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   Resolved completely after 6 days.  No current eye complaints.  No flux HL or associated ear concerns    She has no current eye concerns    Took apap with some relief.      Significant hx migraines prior to   FESS, no aura  No chronic headaches    Saw ophthalmology last spring, optho kaycet, told normal exam.     SNOT 20 today with very mild sneezing, dizziness, mild to slight anosmia, no sinus or nasal complaints    Dupixent started 5/27/21. 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    She is compliant with budesonide irrigations      CT head dated 10/8/2024 from [facility] in [place] was negative.  Mucoperiosteal thickening sinuses noted.   Not loaded in pacs    Dr. Petra Moss MD [facility] notes 10/7/24 visit: Acute onset headache, frontal in location accompanied by right   eye pain no change in vision  Labs reviewed 10/7/2024 no leukocytosis, sed rate 10    Procedures 6/1/21     1.  Bilateral extended maxillary antrostomy with polypectomy  2.  Bilateral revision total ethmoidectomy  3.  Bilateral revision sphenoidotomy  4.  Left frontal sinusotomy  5.  Bilateral submucous reduction inferior turbinates  6.  Modifier 22 for extra 1 hour operating time due to diffuse polyposis and prior surgery     Findings: Severe diffuse  "polyposis occlusive of the former ethmoid cavity and filling the maxillary sinuses  Hypoplastic left frontal sinus and aplastic right frontal sinus     > 100 eos on path    ROS: 12 point review of systems is negative aside from that mentioned in history present all numbness and night sweats    Physical Exam  /80 (BP Location: Left arm, Patient Position: Sitting, Cuff Size: Adult Large)   Pulse 62   Temp 97.7  F (36.5  C) (Tympanic)   Resp 16   Ht 1.575 m (5' 2.01\")   Wt 107.5 kg (237 lb)   SpO2 98%   BMI 43.34 kg/m    General - The patient is well nourished and well developed, and appears to have good nutritional status.  Alert and oriented to person and place, interactive.  Head and Face - Normocephalic and atraumatic, with no gross asymmetry noted of the contour of the facial features.  The facial nerve is intact, with strong symmetric movements.  Neck-no palpable lymphadenopathy or thyroid mass.  Trachea is midline.  Eyes - Extraocular movements intact.   Ears- External auditory canals are patent, right canal dry mildly erythematous,  tympanic membranes are intact without effusion or worrisome retractions   Nose - Nasal mucosa is pink and moist with no abnormal mucus.  The septum was grossly midline and non-obstructive, turbinates of normal size and position.  No polyps, masses, or purulence noted on examination.  Mouth - Examination of the oral cavity shows pink, healthy, moist mucosa.  No lesions or ulceration noted.  The dentition are in good repair.  The tongue is mobile and midline.  Throat - The walls of the oropharynx were smooth, pink, moist, symmetric, and had no lesions or ulcerations.  The tonsillar pillars and soft palate were symmetric.  The uvula was midline on elevation.        To evaluate the nose and sinuses, I performed rigid nasal endoscopy.  I applied topical nasal lidocaine and neosynephrine.    I began with the LEFT side using a 0 degree rigid nasal endoscope, and then similarly " examined the RIGHT side    Findings:  Inferior turbinates:  2+  Dried crusty secretions removed from left extended antrostomy with blakesely, suction  Mild polypoid deg left max  Right max clear  Middle turbinate and middle meatus:  No purulence, no polyposis  Ethmoid cavities clear  Sphenoidotomy sites patent  Superior meatus was evaluated  Frontal recess clear  Mucosa is otherwise healthy throughout,  no obstructive polyps nor concerning polypoid degeneration  Sphenoethmoidal recess without purulence   Nasopharynx clear  The patient tolerated the procedure well    Impression/Plan  Elaine Lara is a 69 year old female    ICD-10-CM    1. Chronic pansinusitis  J32.4 lidocaine 2%-oxymetazoline 0.025% nasal solution 2 spray      2. History of nasal polyps, resolved  Z87.09       3. Migraine without aura and without status migrainosus, not intractable  G43.009       4. h/o FESS (functional endoscopic sinus surgery)  Z98.890         Excellent response to dupixent and post distant FESS  Mucosa overall perfect, slight polypoid change left max, not obstructive        Follow-up with ophthalmology    Continue dupixent, sinuses look great    No evidence of obvious dupixent side effects      Follow-up with me in 1 year    Possible dupixent eye side effects:  (none of which are apparent today, but defer to optho)    Dry eye  Itchy eyes  Inflammation of the whites of the eye (conjunctivitis)  Inflammation of the cornea (keratitis)  Swelling of the eyelids (blepharitis)  Infection    Afua Cowan D.O.  Otolaryngology/Head and Neck Surgery  Allergy              Again, thank you for allowing me to participate in the care of your patient.        Sincerely,        Afua Cowan MD

## 2024-10-15 NOTE — PATIENT INSTRUCTIONS
Follow-up with ophthalmology    Continue dupixent, sinuses look great  No evidence of obvious dupixent side effects    Follow-up with Dr. Cowan in 1 year    Possible dupixent eye side effects:    Dry eye  Itchy eyes  Inflammation of the whites of the eye (conjunctivitis)  Inflammation of the cornea (keratitis)  Swelling of the eyelids (blepharitis)  Infection

## (undated) DEVICE — GLV-6.5 PROTEXIS PI CLASSIC LF/PF

## (undated) DEVICE — SOL-NACL 0.9% 1000ML

## (undated) DEVICE — INFLATION DEVICE-SINUS BALLOON CATH

## (undated) DEVICE — CANISTER-SUCTION 2000CC

## (undated) DEVICE — NDL-27G X 1 1/4" NON-SAFETY

## (undated) DEVICE — TRACKER-PATIENT ENT NIPT

## (undated) DEVICE — PACK-ENT-CUSTOM

## (undated) DEVICE — SPONGE-NEURO PATTY 1/2" X 1" X-RAY DETECTABLE

## (undated) DEVICE — LIGHT HANDLE COVER

## (undated) DEVICE — BIN-ENT BIN

## (undated) DEVICE — RELIEVA SPINPLUS BALLOON SYSTEM

## (undated) DEVICE — IRRIGATION-NACL 1000ML

## (undated) DEVICE — BLADE-INFERIOR TURBINATE 2.0MM

## (undated) DEVICE — ADAPTER-SPECIMEN TRAP

## (undated) DEVICE — BLADE-SCALPEL #15

## (undated) DEVICE — LABEL-STERILE PREPRINTED FOR OR

## (undated) DEVICE — PACK-BASIN SET-UP

## (undated) DEVICE — TUBING-IRRIG. SYSTEM CLEARVISION

## (undated) DEVICE — BLANKET-BAIR LOWER EXTREMITY

## (undated) DEVICE — TUBING-IRRIGATOR STRAIGHTSHOT FUSION

## (undated) DEVICE — IRRIGATION-H2O 1000ML

## (undated) DEVICE — TRACKER-INSTRUMENT ENT NAV

## (undated) DEVICE — SCD SLEEVE-KNEE REG.

## (undated) DEVICE — NASOPORE 4CM FIRM

## (undated) DEVICE — CAUTERY PAD-POLYHESIVE II ADULT

## (undated) DEVICE — GOWN-SURG XL LVL 3 REINFORCED

## (undated) DEVICE — BLADE-FUSION ROTATABLE QUADCUT 3.4MM 13CM

## (undated) DEVICE — CAUTERY PENCIL-W/SUCTION 8FR HANDSWITCHING

## (undated) RX ORDER — DEXAMETHASONE SODIUM PHOSPHATE 10 MG/ML
INJECTION, SOLUTION INTRAMUSCULAR; INTRAVENOUS
Status: DISPENSED
Start: 2021-06-01

## (undated) RX ORDER — PROPOFOL 10 MG/ML
INJECTION, EMULSION INTRAVENOUS
Status: DISPENSED
Start: 2021-06-01

## (undated) RX ORDER — LIDOCAINE HYDROCHLORIDE 20 MG/ML
INJECTION, SOLUTION EPIDURAL; INFILTRATION; INTRACAUDAL; PERINEURAL
Status: DISPENSED
Start: 2021-06-01

## (undated) RX ORDER — KETAMINE HCL IN NACL, ISO-OSM 100MG/10ML
SYRINGE (ML) INJECTION
Status: DISPENSED
Start: 2021-06-01

## (undated) RX ORDER — FENTANYL CITRATE 50 UG/ML
INJECTION, SOLUTION INTRAMUSCULAR; INTRAVENOUS
Status: DISPENSED
Start: 2021-06-01

## (undated) RX ORDER — ONDANSETRON 2 MG/ML
INJECTION INTRAMUSCULAR; INTRAVENOUS
Status: DISPENSED
Start: 2021-06-01